# Patient Record
Sex: MALE | Race: WHITE | NOT HISPANIC OR LATINO | Employment: OTHER | URBAN - METROPOLITAN AREA
[De-identification: names, ages, dates, MRNs, and addresses within clinical notes are randomized per-mention and may not be internally consistent; named-entity substitution may affect disease eponyms.]

---

## 2019-02-27 ENCOUNTER — OFFICE VISIT CONVERTED (OUTPATIENT)
Dept: ORTHOPEDIC SURGERY | Facility: CLINIC | Age: 59
End: 2019-02-27
Attending: ORTHOPAEDIC SURGERY

## 2020-02-24 ENCOUNTER — OFFICE VISIT CONVERTED (OUTPATIENT)
Dept: ORTHOPEDIC SURGERY | Facility: CLINIC | Age: 60
End: 2020-02-24
Attending: ORTHOPAEDIC SURGERY

## 2021-05-15 VITALS — OXYGEN SATURATION: 98 % | BODY MASS INDEX: 35.17 KG/M2 | HEIGHT: 73 IN | WEIGHT: 265.37 LBS | HEART RATE: 72 BPM

## 2021-05-15 VITALS — HEART RATE: 78 BPM | OXYGEN SATURATION: 97 % | HEIGHT: 73 IN | BODY MASS INDEX: 34.59 KG/M2 | WEIGHT: 261 LBS

## 2021-07-14 ENCOUNTER — OFFICE VISIT (OUTPATIENT)
Dept: ORTHOPEDIC SURGERY | Facility: CLINIC | Age: 61
End: 2021-07-14

## 2021-07-14 VITALS — HEART RATE: 75 BPM | BODY MASS INDEX: 36.37 KG/M2 | WEIGHT: 274.4 LBS | HEIGHT: 73 IN | OXYGEN SATURATION: 97 %

## 2021-07-14 DIAGNOSIS — M19.011 PRIMARY OSTEOARTHRITIS OF SHOULDERS, BILATERAL: ICD-10-CM

## 2021-07-14 DIAGNOSIS — M25.511 RIGHT SHOULDER PAIN, UNSPECIFIED CHRONICITY: ICD-10-CM

## 2021-07-14 DIAGNOSIS — M25.512 LEFT SHOULDER PAIN, UNSPECIFIED CHRONICITY: Primary | ICD-10-CM

## 2021-07-14 DIAGNOSIS — M19.012 PRIMARY OSTEOARTHRITIS OF SHOULDERS, BILATERAL: ICD-10-CM

## 2021-07-14 PROCEDURE — 99213 OFFICE O/P EST LOW 20 MIN: CPT | Performed by: ORTHOPAEDIC SURGERY

## 2021-07-14 PROCEDURE — 20610 DRAIN/INJ JOINT/BURSA W/O US: CPT | Performed by: ORTHOPAEDIC SURGERY

## 2021-07-14 RX ORDER — MELOXICAM 15 MG/1
15 TABLET ORAL DAILY
COMMUNITY
End: 2021-11-24 | Stop reason: HOSPADM

## 2021-07-14 RX ORDER — LOSARTAN POTASSIUM 25 MG/1
25 TABLET ORAL DAILY
COMMUNITY

## 2021-07-14 RX ORDER — OMEPRAZOLE 20 MG/1
20 CAPSULE, DELAYED RELEASE ORAL DAILY
COMMUNITY

## 2021-07-14 RX ADMIN — LIDOCAINE HYDROCHLORIDE 9 ML: 10 INJECTION, SOLUTION INFILTRATION; PERINEURAL at 12:25

## 2021-07-14 RX ADMIN — METHYLPREDNISOLONE ACETATE 80 MG: 80 INJECTION, SUSPENSION INTRA-ARTICULAR; INTRALESIONAL; INTRAMUSCULAR; SOFT TISSUE at 12:25

## 2021-07-14 NOTE — PROGRESS NOTES
"Chief Complaint  Initial Evaluation of the Left Shoulder and Initial Evaluation of the Right Shoulder     Subjective      Dell Marie presents to [facility] for follow up evaluation of bilateral shoulder pain. The patient has been having shoulder pain for over a year. He reports he was working a moved wrong and felt a sharp pain in his left shoulder. He states his right is worse than the left. He has previously had injections with relief. He was previously diagnosed with osteoarthritis in his shoulder.     No Known Allergies     Social History     Socioeconomic History   • Marital status:      Spouse name: Not on file   • Number of children: Not on file   • Years of education: Not on file   • Highest education level: Not on file   Tobacco Use   • Smoking status: Former Smoker   • Smokeless tobacco: Never Used   Substance and Sexual Activity   • Alcohol use: Yes     Comment: CURRENT SOME DAY    • Drug use: Not Currently     Comment: FORMER         Review of Systems     Objective   Vital Signs:   Pulse 75   Ht 185.4 cm (73\")   Wt 124 kg (274 lb 6.4 oz)   SpO2 97%   BMI 36.20 kg/m²       Physical Exam  Constitutional:       Appearance: Normal appearance. He is well-developed and normal weight.   HENT:      Head: Normocephalic.      Right Ear: Hearing and external ear normal.      Left Ear: Hearing and external ear normal.      Nose: Nose normal.   Eyes:      Conjunctiva/sclera: Conjunctivae normal.   Cardiovascular:      Rate and Rhythm: Normal rate.   Pulmonary:      Effort: Pulmonary effort is normal.      Breath sounds: No wheezing or rales.   Abdominal:      Palpations: Abdomen is soft.      Tenderness: There is no abdominal tenderness.   Musculoskeletal:      Cervical back: Normal range of motion.   Skin:     Findings: No rash.   Neurological:      Mental Status: He is alert and oriented to person, place, and time.   Psychiatric:         Mood and Affect: Mood and " affect normal.         Judgment: Judgment normal.       Ortho Exam      Bilateral shoulder- rotator cuff weakness. Deformity of biceps distally of the left shoulder. Neurovascularly intact. Sensation to light touch median, radial, ulnar nerve. Positive AIN, PIN, ulnar nerve. Positive pulses. Decreased ROM.     Large Joint Arthrocentesis: R subacromial bursa  Date/Time: 7/14/2021 12:25 PM  Consent given by: patient  Site marked: site marked  Timeout: Immediately prior to procedure a time out was called to verify the correct patient, procedure, equipment, support staff and site/side marked as required   Supporting Documentation  Indications: pain   Procedure Details  Location: shoulder - R subacromial bursa  Preparation: Patient was prepped and draped in the usual sterile fashion  Needle size: 22 G  Medications administered: 9 mL lidocaine 1 %; 80 mg methylPREDNISolone acetate 80 MG/ML  Patient tolerance: patient tolerated the procedure well with no immediate complications    Large Joint Arthrocentesis: L subacromial bursa  Date/Time: 7/14/2021 12:25 PM  Consent given by: patient  Site marked: site marked  Timeout: Immediately prior to procedure a time out was called to verify the correct patient, procedure, equipment, support staff and site/side marked as required   Supporting Documentation  Indications: pain   Procedure Details  Location: shoulder - L subacromial bursa  Needle size: 22 G  Medications administered: 9 mL lidocaine 1 %; 80 mg methylPREDNISolone acetate 80 MG/ML  Patient tolerance: patient tolerated the procedure well with no immediate complications          X-Ray Report:  Bilateral shoulder(s) X-Ray  Indication: Evaluation of bilateral shoulder pain  AP and Lateral view(s)  Findings: moderate degenerative changes  Prior studies available for comparison: yes         Imaging Results (Most Recent)     Procedure Component Value Units Date/Time    XR Scapula Bilateral [[id number]] Resulted: 07/14/21 1207      Updated: 07/14/21 1207           Result Review :       No results found.          Assessment and Plan     DX: bilateral shoulder osteoarthritis     Discussed the risks and benefits of bilateral shoulder injections. The patient expressed understanding and wished to proceed. He tolerated the injections well.     Call or return if worsening symptoms.    Follow Up     Follow up PRN.       Patient was given instructions and counseling regarding his condition or for health maintenance advice. Please see specific information pulled into the AVS if appropriate.     Scribed for Tom Brennan MD by Vivian Chairez.  07/14/21   12:13 EDT    I have personally performed the services described in this document as scribed by the above individual and it is both accurate and complete.  Blaze Steinberg MD 07/14/21  12:13 EDT

## 2021-07-16 RX ORDER — LIDOCAINE HYDROCHLORIDE 10 MG/ML
9 INJECTION, SOLUTION INFILTRATION; PERINEURAL
Status: COMPLETED | OUTPATIENT
Start: 2021-07-14 | End: 2021-07-14

## 2021-07-16 RX ORDER — METHYLPREDNISOLONE ACETATE 80 MG/ML
80 INJECTION, SUSPENSION INTRA-ARTICULAR; INTRALESIONAL; INTRAMUSCULAR; SOFT TISSUE
Status: COMPLETED | OUTPATIENT
Start: 2021-07-14 | End: 2021-07-14

## 2021-07-28 NOTE — PROGRESS NOTES
"Chief Complaint  Initial Evaluation of the Left Shoulder and Initial Evaluation of the Right Shoulder     Subjective      Dell Marie presents to [facility] for follow up evaluation of bilateral shoulder pain. The patient has been having shoulder pain for over a year. He reports he was working a moved wrong and felt a sharp pain in his left shoulder. He states his right is worse than the left. He has previously had injections with relief. He was previously diagnosed with osteoarthritis in his shoulder.     No Known Allergies     Social History     Socioeconomic History   • Marital status:      Spouse name: Not on file   • Number of children: Not on file   • Years of education: Not on file   • Highest education level: Not on file   Tobacco Use   • Smoking status: Former Smoker   • Smokeless tobacco: Never Used   Substance and Sexual Activity   • Alcohol use: Yes     Comment: CURRENT SOME DAY    • Drug use: Not Currently     Comment: FORMER         Review of Systems     Objective   Vital Signs:   Pulse 75   Ht 185.4 cm (73\")   Wt 124 kg (274 lb 6.4 oz)   SpO2 97%   BMI 36.20 kg/m²       Physical Exam  Constitutional:       Appearance: Normal appearance. He is well-developed and normal weight.   HENT:      Head: Normocephalic.      Right Ear: Hearing and external ear normal.      Left Ear: Hearing and external ear normal.      Nose: Nose normal.   Eyes:      Conjunctiva/sclera: Conjunctivae normal.   Cardiovascular:      Rate and Rhythm: Normal rate.   Pulmonary:      Effort: Pulmonary effort is normal.      Breath sounds: No wheezing or rales.   Abdominal:      Palpations: Abdomen is soft.      Tenderness: There is no abdominal tenderness.   Musculoskeletal:      Cervical back: Normal range of motion.   Skin:     Findings: No rash.   Neurological:      Mental Status: He is alert and oriented to person, place, and time.   Psychiatric:         Mood and Affect: Mood and " affect normal.         Judgment: Judgment normal.       Ortho Exam      Bilateral shoulder- rotator cuff weakness. Deformity of biceps distally of the left shoulder. Neurovascularly intact. Sensation to light touch median, radial, ulnar nerve. Positive AIN, PIN, ulnar nerve. Positive pulses. Decreased ROM.     Procedures    X-Ray Report:  Bilateral shoulder(s) X-Ray  Indication: Evaluation of bilateral shoulder pain  AP and Lateral view(s)  Findings: moderate degenerative changes  Prior studies available for comparison: yes         Imaging Results (Most Recent)     Procedure Component Value Units Date/Time    XR Scapula Bilateral [[id number]] Resulted: 07/16/21 1353     Updated: 07/16/21 1354    Narrative:      X-Ray Report:  Bilateral shoulder(s) X-Ray  Indication: Evaluation of bilateral shoulder pain  AP and Lateral view(s)  Findings: moderate degenerative changes  Prior studies available for comparison: yes            Result Review :       XR Scapula Bilateral    Result Date: 7/16/2021  Narrative: X-Ray Report: Bilateral shoulder(s) X-Ray Indication: Evaluation of bilateral shoulder pain AP and Lateral view(s) Findings: moderate degenerative changes Prior studies available for comparison: yes             Assessment and Plan     DX: bilateral shoulder osteoarthritis     Discussed the risks and benefits of bilateral shoulder injections. The patient expressed understanding and wished to proceed. He tolerated the injections well.     Call or return if worsening symptoms.    Follow Up     Follow up PRN.       Patient was given instructions and counseling regarding his condition or for health maintenance advice. Please see specific information pulled into the AVS if appropriate.     Scribed for Tom Brennan MD by Vivian Chairez.  07/14/21   12:13 EDT    I have personally performed the services described in this document as scribed by the above individual and it is both accurate and complete. Tom Brennan,  MD 07/28/21

## 2021-08-11 ENCOUNTER — TELEPHONE (OUTPATIENT)
Dept: ORTHOPEDIC SURGERY | Facility: CLINIC | Age: 61
End: 2021-08-11

## 2021-08-11 DIAGNOSIS — M25.511 RIGHT SHOULDER PAIN, UNSPECIFIED CHRONICITY: ICD-10-CM

## 2021-08-11 DIAGNOSIS — M25.512 LEFT SHOULDER PAIN, UNSPECIFIED CHRONICITY: Primary | ICD-10-CM

## 2021-08-11 NOTE — TELEPHONE ENCOUNTER
Patient had appt on 7/14 and got lisa injection that day. He states Doc told him he can call to get an order for an abhi. He wants an order for ISABELLA shoulder MRI in [facility].

## 2021-08-30 ENCOUNTER — HOSPITAL ENCOUNTER (OUTPATIENT)
Dept: MRI IMAGING | Facility: HOSPITAL | Age: 61
Discharge: HOME | End: 2021-08-30

## 2021-08-30 DIAGNOSIS — M25.511 RIGHT SHOULDER PAIN, UNSPECIFIED CHRONICITY: ICD-10-CM

## 2021-08-30 DIAGNOSIS — M25.512 LEFT SHOULDER PAIN, UNSPECIFIED CHRONICITY: ICD-10-CM

## 2021-08-30 PROCEDURE — 73221 MRI JOINT UPR EXTREM W/O DYE: CPT

## 2021-09-03 ENCOUNTER — OFFICE VISIT (OUTPATIENT)
Dept: ORTHOPEDIC SURGERY | Facility: CLINIC | Age: 61
End: 2021-09-03

## 2021-09-03 ENCOUNTER — PREP FOR SURGERY (OUTPATIENT)
Dept: OTHER | Facility: HOSPITAL | Age: 61
End: 2021-09-03

## 2021-09-03 VITALS — HEART RATE: 78 BPM | OXYGEN SATURATION: 98 % | HEIGHT: 73 IN | WEIGHT: 278.6 LBS | BODY MASS INDEX: 36.92 KG/M2

## 2021-09-03 DIAGNOSIS — M12.812 ROTATOR CUFF ARTHROPATHY, LEFT: Primary | ICD-10-CM

## 2021-09-03 DIAGNOSIS — M75.122 COMPLETE TEAR OF LEFT ROTATOR CUFF, UNSPECIFIED WHETHER TRAUMATIC: Primary | ICD-10-CM

## 2021-09-03 PROCEDURE — 99213 OFFICE O/P EST LOW 20 MIN: CPT | Performed by: PHYSICIAN ASSISTANT

## 2021-09-03 RX ORDER — TRANEXAMIC ACID 10 MG/ML
1000 INJECTION, SOLUTION INTRAVENOUS ONCE
Status: CANCELLED | OUTPATIENT
Start: 2021-09-03 | End: 2021-09-03

## 2021-09-03 RX ORDER — CEFAZOLIN SODIUM IN 0.9 % NACL 3 G/100 ML
3 INTRAVENOUS SOLUTION, PIGGYBACK (ML) INTRAVENOUS ONCE
Status: CANCELLED | OUTPATIENT
Start: 2021-09-03 | End: 2021-09-03

## 2021-09-03 RX ORDER — CEFAZOLIN SODIUM 2 G/100ML
2 INJECTION, SOLUTION INTRAVENOUS ONCE
Status: CANCELLED | OUTPATIENT
Start: 2021-09-03 | End: 2021-09-03

## 2021-09-03 NOTE — PROGRESS NOTES
"Chief Complaint  Pain of the Right Shoulder and Pain of the Left Shoulder    Subjective          Dell Marie presents to [facility] for follow up of bilateral shoulder pain. Patient was having ongoing shoulder pain for several years that he states has worsened with time. He states he was given injections at his last visit on 07/14/21. He states injections helped with the right shoulder but not his left. He states he cannot even hold a pencil with his left hand because of pain and weakness of his shoulder. He states pain is constant like a toothache of his left shoulder. He states it has been this way since he can remember. Patient presents today with MRI results of bilateral shoulders.     Objective   Vital Signs:   Pulse 78   Ht 185.4 cm (73\")   Wt 126 kg (278 lb 9.6 oz)   SpO2 98%   BMI 36.76 kg/m²       Physical Exam  Constitutional:       Appearance: Normal appearance. Patient is well-developed and normal weight.   HENT:      Head: Normocephalic.      Right Ear: Hearing and external ear normal.      Left Ear: Hearing and external ear normal.      Nose: Nose normal.   Eyes:      Conjunctiva/sclera: Conjunctivae normal.   Cardiovascular:      Rate and Rhythm: Normal rate.   Pulmonary:      Effort: Pulmonary effort is normal.      Breath sounds: No wheezing or rales.   Abdominal:      Palpations: Abdomen is soft.      Tenderness: There is no abdominal tenderness.   Musculoskeletal:      Cervical back: Normal range of motion.   Skin:     Findings: No rash.   Neurological:      Mental Status: Patient is alert and oriented to person, place, and time.   Psychiatric:         Mood and Affect: Mood and affect normal.         Judgment: Judgment normal.     Ortho Exam  Left shoulder: Sensation grossly intact.  Neurovascular intact.  Radial and ulnar pulse are 2+.  Atrophy of the RC.  Tenderness palpation of biceps tendon insertion.  Tender on lateral shoulder.  Decreased strength of RC, " 3/5.  Decreased active range of motion in all planes.  Full AROM of the elbow and of the wrist.    Right shoulder: Sensation grossly intact.  Neurovascular intact.  Radial and ulnar pulse are 2+.  Rotator cuff atrophy.  Tenderness over scapula.  Decreased strength bilaterally.  AROM is decreased in all planes.  Patient able to flex and extend the elbow.  Able make a fist with good  strength.    Result Review :   The following data was reviewed by: MISTY Heredia on 09/03/2021:         Imaging Results (Most Recent)     None           MRI Shoulder Right Without Contrast    Result Date: 8/30/2021  Narrative: PROCEDURE: MRI SHOULDER RIGHT WO CONTRAST  COMPARISON: [facility] , CR, XR SCAPULA BILATERAL, 7/14/2021, 12:07.  INDICATIONS: Right Shoulder Pain CHRONIC PAIN. NO KNOWN INJURY.  TECHNIQUE: A variety of imaging planes and parameters were utilized for visualization of suspected pathology.  Images were performed without contrast.   FINDINGS:  ROTATOR CUFF:  Full-thickness, full width tear of the supraspinatus tendon from the footplate with approximately 4 cm medial retraction of the bulk of the tendon fibers.  Full-thickness, near full width articular sided tear of the subscapularis tendon from the footplate with up to 4 cm medial retraction of the articular sided tendon fibers and only a few of the far inferior bursal sided tendon fibers remaining intact.  Moderate infraspinatus tendinopathy with mild partial-thickness articular sided fraying at the footplate.  Teres minor is intact.  Mild (grade 1/4) fatty atrophy of the rotator cuff muscles.  No myositis.  LONG HEAD BICEPS TENDON:  Mild to moderate intra-articular long head biceps tendinopathy with the tendon dislocated medially from the bicipital groove along the anterior glenohumeral joint space, deep to the subscapularis tendon.  ACROMIOCLAVICULAR ARCH:  Moderate hypertrophic degenerative changes at the acromioclavicular joint with a small joint  effusion.  Type II acromion without lateral downsloping.  Abnormal fluid in the subacromial/subdeltoid bursa communicating through the full-thickness rotator cuff tear.  GLENOHUMERAL JOINT:  Superior subluxation of the humeral head in relation to the glenoid, likely related to the rotator cuff pathology.  Small joint effusion.  Mild diffuse chondromalacia/DJD.  No discrete intra-articular body is identified.  The joint capsule is within normal limits.  BONES:  Mild degenerative subcortical cystic changes in the distal clavicle and acromion.  Nonspecific subcortical cystic changes in the greater tuberosity and posterior humeral head.  No fracture.  No concerning bone marrow lesion or marrow replacing process.  LABRUM: No evidence of acute labral tear.  No paralabral cyst.  SOFT TISSUES: No soft tissue mass.  CONCLUSION:  1. Full-thickness, full width tear of the supraspinatus tendon from the footplate with medial retraction. 2. Full-thickness, near full width tear of the subscapularis tendon with medial retraction. 3. Moderate infraspinatus tendinopathy with mild partial-thickness articular sided fraying at the footplate. 4. Mild to moderate intra-articular long head biceps tendinopathy with the tendon dislocated medially from the bicipital groove along the anterior glenohumeral joint space. 5. Mild to moderate glenohumeral joint chondromalacia/DJD with a small joint effusion.  6. Moderate DJD at the AC joint with a small joint effusion.      OMAR BARRIENTOS MD       Electronically Signed and Approved By: OMAR BARRIENTOS MD on 8/30/2021 at 11:46             MRI Shoulder Left Without Contrast    Result Date: 8/30/2021  Narrative: PROCEDURE: MRI SHOULDER LEFT WO CONTRAST  COMPARISON: [facility] , CR, XR SCAPULA BILATERAL, 7/14/2021, 12:07.  INDICATIONS: Left Shoulder Pain ALL OVER CHRONIC PAIN. NO KNOWN INJURY.  TECHNIQUE: A variety of imaging planes and parameters were utilized for visualization of suspected  pathology.  Images were performed without contrast.   FINDINGS:  ROTATOR CUFF:  Full-thickness, near full width tear of the supraspinatus tendon from the footplate with up to approximately 3 cm medial retraction of the bulk of the articular sided tendon fibers and only a few of the far posterior bursal sided tendon fibers remaining intact on the greater tuberosity.  Moderate to high-grade partial thickness, near full width articular sided tear of the subscapularis tendon from the footplate, involving up to approximately 50 percent of the articular sided tendon fibers and with mild medial retraction of the articular sided tendon fibers.  Mild subscapularis tendinopathy.  Teres minor is intact.  Mild (grade 1/4) fatty atrophy of the rotator cuff muscles.  Mild likely posttraumatic edema in the supraspinatus and infraspinatus muscles.  No myositis.  LONG HEAD BICEPS TENDON:  The intra-articular long head biceps tendon is not identified, suggesting full-thickness tear and distal retraction.  ACROMIOCLAVICULAR ARCH:  Moderate hypertrophic degenerative changes at the acromioclavicular joint with a small joint effusion.  Type II acromion without lateral downsloping.  Abnormal fluid in the subacromial/subdeltoid bursa, communicating through the full-thickness rotator cuff tear.  GLENOHUMERAL JOINT:  Mild superior subluxation of the humeral head in relation to the glenoid, likely related to the rotator cuff pathology.  Small joint effusion.  Mild diffuse chondromalacia/DJD.  No discrete intra-articular body is identified.  The joint capsule is within normal limits.  BONES:  Nonspecific subcortical cystic changes in the greater tuberosity.  No fracture.  No concerning bone marrow lesion or marrow replacing process.  LABRUM:  Degenerative signal and morphology of the superior labrum.  No paralabral cyst.  SOFT TISSUES: No soft tissue mass.  CONCLUSION:  1. Full-thickness, near full width tear of the supraspinatus tendon from  the footplate with medial retraction and only a few of the far posterior bursal sided tendon fibers remaining intact on the greater tuberosity. 2. Moderate to high-grade partial thickness, near full width articular sided tear of the infraspinatus tendon at the footplate. 3. The intra-articular long head biceps tendon is not identified, suggesting full-thickness tear and distal retraction.  4. Mild subscapularis tendinopathy.  New mild glenohumeral joint chondromalacia/DJD with a small joint effusion. 5. Moderate DJD at the AC joint with a small joint effusion.      OMAR BARRIENTOS MD       Electronically Signed and Approved By: OMAR BARRIENTOS MD on 8/30/2021 at 11:41                  Assessment and Plan    Problem List Items Addressed This Visit        Musculoskeletal and Injuries    Complete tear of left rotator cuff - Primary          Follow Up   Return for post op.  Patient Instructions   Dr. Brennan discussed treatment options with patient. Risks and benefits of proceeding with total reverse shoulder arthroplasty, as well as post op care.   Patient wishes to proceed with reverse TSA and will schedule today.   Please call with any changes or concerns. Otherwise, follow up post-operatively.     Patient was given instructions and counseling regarding his condition or for health maintenance advice. Please see specific information pulled into the AVS if appropriate.

## 2021-09-03 NOTE — PATIENT INSTRUCTIONS
Been discussed treatment options with patient. Risks and benefits of proceeding with total reverse shoulder arthroplasty, as well as post op care.   Patient wishes to proceed with reverse TSA and will schedule today.   Please call with any changes or concerns. Otherwise, follow up post-operatively.

## 2021-09-28 ENCOUNTER — APPOINTMENT (OUTPATIENT)
Dept: PREADMISSION TESTING | Facility: HOSPITAL | Age: 61
End: 2021-09-28

## 2021-11-10 ENCOUNTER — PRE-ADMISSION TESTING (OUTPATIENT)
Dept: PREADMISSION TESTING | Facility: HOSPITAL | Age: 61
End: 2021-11-10

## 2021-11-10 VITALS
HEIGHT: 73 IN | BODY MASS INDEX: 35.85 KG/M2 | TEMPERATURE: 98.6 F | OXYGEN SATURATION: 98 % | HEART RATE: 62 BPM | WEIGHT: 270.5 LBS | SYSTOLIC BLOOD PRESSURE: 150 MMHG | DIASTOLIC BLOOD PRESSURE: 106 MMHG

## 2021-11-10 DIAGNOSIS — M12.812 ROTATOR CUFF ARTHROPATHY, LEFT: ICD-10-CM

## 2021-11-10 LAB
ALBUMIN SERPL-MCNC: 4 G/DL (ref 3.5–5.2)
ALBUMIN/GLOB SERPL: 1.3 G/DL
ALP SERPL-CCNC: 63 U/L (ref 39–117)
ALT SERPL W P-5'-P-CCNC: 27 U/L (ref 1–41)
ANION GAP SERPL CALCULATED.3IONS-SCNC: 7.6 MMOL/L (ref 5–15)
AST SERPL-CCNC: 24 U/L (ref 1–40)
BASOPHILS # BLD AUTO: 0.03 10*3/MM3 (ref 0–0.2)
BASOPHILS NFR BLD AUTO: 0.5 % (ref 0–1.5)
BILIRUB SERPL-MCNC: 0.3 MG/DL (ref 0–1.2)
BUN SERPL-MCNC: 17 MG/DL (ref 6–20)
BUN/CREAT SERPL: 16.8 (ref 7–25)
CALCIUM SPEC-SCNC: 8.9 MG/DL (ref 8.6–10.5)
CHLORIDE SERPL-SCNC: 102 MMOL/L (ref 98–107)
CO2 SERPL-SCNC: 26.4 MMOL/L (ref 22–29)
CREAT SERPL-MCNC: 1.01 MG/DL (ref 0.76–1.27)
DEPRECATED RDW RBC AUTO: 40.6 FL (ref 37–54)
EOSINOPHIL # BLD AUTO: 0.17 10*3/MM3 (ref 0–0.4)
EOSINOPHIL NFR BLD AUTO: 2.7 % (ref 0.3–6.2)
ERYTHROCYTE [DISTWIDTH] IN BLOOD BY AUTOMATED COUNT: 11.7 % (ref 12.3–15.4)
GFR SERPL CREATININE-BSD FRML MDRD: 76 ML/MIN/1.73
GLOBULIN UR ELPH-MCNC: 3 GM/DL
GLUCOSE SERPL-MCNC: 99 MG/DL (ref 65–99)
HBA1C MFR BLD: 6.06 % (ref 4.8–5.6)
HCT VFR BLD AUTO: 46.9 % (ref 37.5–51)
HGB BLD-MCNC: 16.1 G/DL (ref 13–17.7)
IMM GRANULOCYTES # BLD AUTO: 0.03 10*3/MM3 (ref 0–0.05)
IMM GRANULOCYTES NFR BLD AUTO: 0.5 % (ref 0–0.5)
INR PPP: 1.08 (ref 2–3)
LYMPHOCYTES # BLD AUTO: 2.45 10*3/MM3 (ref 0.7–3.1)
LYMPHOCYTES NFR BLD AUTO: 39.5 % (ref 19.6–45.3)
MCH RBC QN AUTO: 32.3 PG (ref 26.6–33)
MCHC RBC AUTO-ENTMCNC: 34.3 G/DL (ref 31.5–35.7)
MCV RBC AUTO: 94 FL (ref 79–97)
MONOCYTES # BLD AUTO: 0.71 10*3/MM3 (ref 0.1–0.9)
MONOCYTES NFR BLD AUTO: 11.5 % (ref 5–12)
NEUTROPHILS NFR BLD AUTO: 2.81 10*3/MM3 (ref 1.7–7)
NEUTROPHILS NFR BLD AUTO: 45.3 % (ref 42.7–76)
NRBC BLD AUTO-RTO: 0 /100 WBC (ref 0–0.2)
PLATELET # BLD AUTO: 199 10*3/MM3 (ref 140–450)
PMV BLD AUTO: 10.9 FL (ref 6–12)
POTASSIUM SERPL-SCNC: 4 MMOL/L (ref 3.5–5.2)
PROT SERPL-MCNC: 7 G/DL (ref 6–8.5)
PROTHROMBIN TIME: 11.2 SECONDS (ref 9.4–12)
QT INTERVAL: 388 MS
RBC # BLD AUTO: 4.99 10*6/MM3 (ref 4.14–5.8)
SODIUM SERPL-SCNC: 136 MMOL/L (ref 136–145)
WBC # BLD AUTO: 6.2 10*3/MM3 (ref 3.4–10.8)

## 2021-11-10 PROCEDURE — 93010 ELECTROCARDIOGRAM REPORT: CPT | Performed by: SPECIALIST

## 2021-11-10 PROCEDURE — 85610 PROTHROMBIN TIME: CPT

## 2021-11-10 PROCEDURE — 36415 COLL VENOUS BLD VENIPUNCTURE: CPT

## 2021-11-10 PROCEDURE — 80053 COMPREHEN METABOLIC PANEL: CPT

## 2021-11-10 PROCEDURE — 83036 HEMOGLOBIN GLYCOSYLATED A1C: CPT

## 2021-11-10 PROCEDURE — 93005 ELECTROCARDIOGRAM TRACING: CPT

## 2021-11-10 PROCEDURE — 85025 COMPLETE CBC W/AUTO DIFF WBC: CPT

## 2021-11-10 RX ORDER — SULFASALAZINE 500 MG/1
500 TABLET ORAL 2 TIMES DAILY
COMMUNITY

## 2021-11-10 RX ORDER — TADALAFIL 10 MG/1
10 TABLET ORAL DAILY PRN
Status: ON HOLD | COMMUNITY
End: 2021-11-23

## 2021-11-10 NOTE — DISCHARGE INSTRUCTIONS
IMPORTANT INSTRUCTIONS - PRE-ADMISSION TESTING  1. DO NOT EAT OR CHEW anything after midnight the night before your procedure.    2. You may have CLEAR liquids up to __3___ hours prior to ARRIVAL time.   3. Take the following medications the morning of your procedure with JUST A SIP OF WATER:  _none______    4. DO NOT BRING your medications to the hospital with you, UNLESS something has changed since your PRE-Admission Testing appointment.  5. Hold all vitamins, supplements, and NSAIDS (Non- steroidal anti-inflammatory meds) for one week prior to surgery (you MAY take Tylenol or Acetaminophen).  6. If you are diabetic, check your blood sugar the morning of your procedure. If it is less than 70 or if you are feeling symptomatic, call the following number for further instructions: [phone]  7. Use your inhalers/nebulizers as usual, the morning of your procedure. BRING YOUR INHALERS with you.   8. Bring your CPAP or BIPAP to hospital, ONLY IF YOU WILL BE SPENDING THE NIGHT.   9. Make sure you have a ride home and have someone who will stay with you the day of your procedure after you go home.  10. If you have any questions, please call your Pre-Admission Testing Nurse  at [phone]   11. Per anesthesia request, do not smoke for 24 hours before your procedure or as instructed by your surgeon.      ••••••Clear Liquid Diet        Find out when you need to start a clear liquid diet.   Think of “clear liquids” as anything you could read a newspaper through. This includes things like water, broth, sports drinks, or tea WITHOUT any kind of milk or cream.           Once you are told to start a clear liquid diet, only drink these things until 3 hours before arrival to the hospital or when the hospital says to stop. Total volume limitation: 8 oz.       Clear liquids you CAN drink:   ; Water   ; Clear broth: beef, chicken, vegetable, or bone broth with nothing in it   ; Gatorade   ; Lemonade or Bertin-aid   ; Soda    ; Tea, coffee (NO cream or honey)   ; Jell-O (without fruit)   ; Popsicles (without fruit or cream)   ; Italian ices   ; Juice without pulp: apple, white, grape   ; You may use salt, pepper, and sugar    Do NOT drink:   ; Milk or cream   ; Soy milk, almond milk, coconut milk, or other non-dairy drinks and   creamers   ; Milkshakes or smoothies   ; Tomato juice   ; Orange juice   ; Grapefruit juice   ; Cream soups or any other than broth         Clear Liquid Diet:  ? Do NOT eat any solid food.  ? Do NOT eat or suck on mints or candy.  ? Do NOT chew gum.  ? Do NOT drink thick liquids like milk or juice with pulp in it.  ? Do NOT add milk, cream, or anything like soy milk or almond milk to coffee or tea.     PREOPERATIVE (BEFORE SURGERY)              BATHING INSTRUCTIONS  Instructions:   • You will need to shower 3 separate times utilizing the soap provided; at the times indicated   below:     11/21/21- PM   11/22/21- AM  11/22/21- PM     • Wash your hair and face with normal shampoo and soap, rinse it well before using the surgical soap.     • In the shower, wet the skin completely with water from your neck to your feet. Apply the cleanser to your   body ONLY FROM THE NECK TO YOUR FEET.    • Do NOT USE THE CLEANSER ON YOUR FACE, HEAD, OR GENITAL (PRIVATE) AREAS.   Keep it out of your eyes, ears, and mouth because of the risk of injury to those areas.     • Scrub with a clean washcloth for each bath utilizing the soap provided from the top of your body to the   bottom starting at the neck area.     • Pay close attention to your armpits, groin area, and the site of surgery.     • Wash your body gently for 5 minutes. Stand outside the stream or turn off the water while scrubbing your   body. Do NOT wash with your regular soap after the surgical cleanser is used.     • RINSE THE CLEANSER OFF COMPLETELY with plenty of water. Rinse the area again thoroughly.     • Dry off with a clean towel. The surgical soap can cause  dryness; however do NOT APPLY LOTION,   CREAM, POWDER, and/or DEODORANT AFTER SHOWERING.    • Be sure to where clean clothes after showering.     • Ensure CLEAN BED LINENS AFTER FIRST wash with the surgical soap.     • NO PETS ALLOWED IN THE BED with you after utilizing the surgical soap.

## 2021-11-12 ENCOUNTER — ANESTHESIA EVENT (OUTPATIENT)
Dept: PERIOP | Facility: HOSPITAL | Age: 61
End: 2021-11-12

## 2021-11-23 ENCOUNTER — ANESTHESIA (OUTPATIENT)
Dept: PERIOP | Facility: HOSPITAL | Age: 61
End: 2021-11-23

## 2021-11-23 ENCOUNTER — HOSPITAL ENCOUNTER (OUTPATIENT)
Facility: HOSPITAL | Age: 61
Discharge: HOME | End: 2021-11-24
Attending: ORTHOPAEDIC SURGERY | Admitting: ORTHOPAEDIC SURGERY

## 2021-11-23 ENCOUNTER — APPOINTMENT (OUTPATIENT)
Dept: GENERAL RADIOLOGY | Facility: HOSPITAL | Age: 61
End: 2021-11-23

## 2021-11-23 DIAGNOSIS — R26.2 DIFFICULTY IN WALKING: ICD-10-CM

## 2021-11-23 DIAGNOSIS — M12.812 ROTATOR CUFF ARTHROPATHY OF LEFT SHOULDER: ICD-10-CM

## 2021-11-23 DIAGNOSIS — M12.812 ROTATOR CUFF ARTHROPATHY, LEFT: ICD-10-CM

## 2021-11-23 DIAGNOSIS — Z78.9 DECREASED ACTIVITIES OF DAILY LIVING (ADL): Primary | ICD-10-CM

## 2021-11-23 PROCEDURE — 25010000002 PROPOFOL 10 MG/ML EMULSION: Performed by: NURSE ANESTHETIST, CERTIFIED REGISTERED

## 2021-11-23 PROCEDURE — 73020 X-RAY EXAM OF SHOULDER: CPT

## 2021-11-23 PROCEDURE — 94799 UNLISTED PULMONARY SVC/PX: CPT

## 2021-11-23 PROCEDURE — 0 CEFAZOLIN IN DEXTROSE 2-4 GM/100ML-% SOLUTION: Performed by: ORTHOPAEDIC SURGERY

## 2021-11-23 PROCEDURE — 25010000002 FENTANYL CITRATE (PF) 50 MCG/ML SOLUTION: Performed by: NURSE ANESTHETIST, CERTIFIED REGISTERED

## 2021-11-23 PROCEDURE — 23472 RECONSTRUCT SHOULDER JOINT: CPT | Performed by: ORTHOPAEDIC SURGERY

## 2021-11-23 PROCEDURE — 25010000002 MIDAZOLAM PER 1MG: Performed by: ANESTHESIOLOGY

## 2021-11-23 PROCEDURE — 25010000002 DEXAMETHASONE PER 1 MG: Performed by: NURSE ANESTHETIST, CERTIFIED REGISTERED

## 2021-11-23 PROCEDURE — 99204 OFFICE O/P NEW MOD 45 MIN: CPT | Performed by: INTERNAL MEDICINE

## 2021-11-23 PROCEDURE — 25010000002 CEFAZOLIN PER 500 MG: Performed by: ORTHOPAEDIC SURGERY

## 2021-11-23 PROCEDURE — C1776 JOINT DEVICE (IMPLANTABLE): HCPCS | Performed by: ORTHOPAEDIC SURGERY

## 2021-11-23 PROCEDURE — 25010000002 ONDANSETRON PER 1 MG: Performed by: NURSE ANESTHETIST, CERTIFIED REGISTERED

## 2021-11-23 DEVICE — BASEPLT GLEN COMPR MINI W TPR ADAPTR 25: Type: IMPLANTABLE DEVICE | Site: SHOULDER | Status: FUNCTIONAL

## 2021-11-23 DEVICE — SCRW COMPRNSV CNTRL 6.5X35MM REUS: Type: IMPLANTABLE DEVICE | Site: SHOULDER | Status: FUNCTIONAL

## 2021-11-23 DEVICE — TOTL SHLDER REV: Type: IMPLANTABLE DEVICE | Site: SHOULDER | Status: FUNCTIONAL

## 2021-11-23 DEVICE — BEAR HUM PROLNG STD 36MM: Type: IMPLANTABLE DEVICE | Site: SHOULDER | Status: FUNCTIONAL

## 2021-11-23 DEVICE — SCRW FIX LK HEX 4.75X30MM: Type: IMPLANTABLE DEVICE | Site: SHOULDER | Status: FUNCTIONAL

## 2021-11-23 DEVICE — TRY HUM/SHLDR COMPREHENSIVE/REVERSE MINI COCR STD 40MM: Type: IMPLANTABLE DEVICE | Site: SHOULDER | Status: FUNCTIONAL

## 2021-11-23 DEVICE — SCRW FIX LK HEX 4.75X15MM: Type: IMPLANTABLE DEVICE | Site: SHOULDER | Status: FUNCTIONAL

## 2021-11-23 DEVICE — GLENOSPHERE VERSA DIAL FIX STD 36MM: Type: IMPLANTABLE DEVICE | Site: SHOULDER | Status: FUNCTIONAL

## 2021-11-23 DEVICE — STEM HUM/SHLDR COMPREHENSIVE MINI 11X83MM: Type: IMPLANTABLE DEVICE | Site: SHOULDER | Status: FUNCTIONAL

## 2021-11-23 RX ORDER — SULFASALAZINE 500 MG/1
500 TABLET ORAL 2 TIMES DAILY
Status: DISCONTINUED | OUTPATIENT
Start: 2021-11-23 | End: 2021-11-24 | Stop reason: HOSPADM

## 2021-11-23 RX ORDER — CEFAZOLIN SODIUM 2 G/100ML
2 INJECTION, SOLUTION INTRAVENOUS EVERY 8 HOURS
Status: COMPLETED | OUTPATIENT
Start: 2021-11-23 | End: 2021-11-24

## 2021-11-23 RX ORDER — CEFAZOLIN SODIUM 2 G/100ML
2 INJECTION, SOLUTION INTRAVENOUS ONCE
Status: DISCONTINUED | OUTPATIENT
Start: 2021-11-23 | End: 2021-11-23 | Stop reason: DRUGHIGH

## 2021-11-23 RX ORDER — AMOXICILLIN 250 MG
2 CAPSULE ORAL 2 TIMES DAILY PRN
Status: DISCONTINUED | OUTPATIENT
Start: 2021-11-23 | End: 2021-11-24 | Stop reason: HOSPADM

## 2021-11-23 RX ORDER — PHENYLEPHRINE HCL IN 0.9% NACL 1 MG/10 ML
SYRINGE (ML) INTRAVENOUS AS NEEDED
Status: DISCONTINUED | OUTPATIENT
Start: 2021-11-23 | End: 2021-11-23 | Stop reason: SURG

## 2021-11-23 RX ORDER — PANTOPRAZOLE SODIUM 40 MG/1
40 TABLET, DELAYED RELEASE ORAL EVERY MORNING
Status: DISCONTINUED | OUTPATIENT
Start: 2021-11-24 | End: 2021-11-23

## 2021-11-23 RX ORDER — GLYCOPYRROLATE 0.2 MG/ML
0.2 INJECTION INTRAMUSCULAR; INTRAVENOUS
Status: COMPLETED | OUTPATIENT
Start: 2021-11-23 | End: 2021-11-23

## 2021-11-23 RX ORDER — SODIUM CHLORIDE 0.9 % (FLUSH) 0.9 %
10 SYRINGE (ML) INJECTION AS NEEDED
Status: DISCONTINUED | OUTPATIENT
Start: 2021-11-23 | End: 2021-11-24 | Stop reason: HOSPADM

## 2021-11-23 RX ORDER — ONDANSETRON 2 MG/ML
INJECTION INTRAMUSCULAR; INTRAVENOUS AS NEEDED
Status: DISCONTINUED | OUTPATIENT
Start: 2021-11-23 | End: 2021-11-23 | Stop reason: SURG

## 2021-11-23 RX ORDER — LIDOCAINE HYDROCHLORIDE 20 MG/ML
INJECTION, SOLUTION INFILTRATION; PERINEURAL AS NEEDED
Status: DISCONTINUED | OUTPATIENT
Start: 2021-11-23 | End: 2021-11-23 | Stop reason: SURG

## 2021-11-23 RX ORDER — PROPOFOL 10 MG/ML
VIAL (ML) INTRAVENOUS AS NEEDED
Status: DISCONTINUED | OUTPATIENT
Start: 2021-11-23 | End: 2021-11-23 | Stop reason: SURG

## 2021-11-23 RX ORDER — FAMOTIDINE 20 MG/1
40 TABLET, FILM COATED ORAL DAILY
Status: DISCONTINUED | OUTPATIENT
Start: 2021-11-23 | End: 2021-11-24 | Stop reason: HOSPADM

## 2021-11-23 RX ORDER — PROMETHAZINE HYDROCHLORIDE 25 MG/1
25 SUPPOSITORY RECTAL ONCE AS NEEDED
Status: DISCONTINUED | OUTPATIENT
Start: 2021-11-23 | End: 2021-11-23 | Stop reason: HOSPADM

## 2021-11-23 RX ORDER — SODIUM CHLORIDE, SODIUM LACTATE, POTASSIUM CHLORIDE, CALCIUM CHLORIDE 600; 310; 30; 20 MG/100ML; MG/100ML; MG/100ML; MG/100ML
100 INJECTION, SOLUTION INTRAVENOUS CONTINUOUS
Status: DISCONTINUED | OUTPATIENT
Start: 2021-11-23 | End: 2021-11-24 | Stop reason: HOSPADM

## 2021-11-23 RX ORDER — MIDAZOLAM HYDROCHLORIDE 2 MG/2ML
4 INJECTION, SOLUTION INTRAMUSCULAR; INTRAVENOUS ONCE
Status: COMPLETED | OUTPATIENT
Start: 2021-11-23 | End: 2021-11-23

## 2021-11-23 RX ORDER — OXYCODONE HYDROCHLORIDE 5 MG/1
5 TABLET ORAL
Status: DISCONTINUED | OUTPATIENT
Start: 2021-11-23 | End: 2021-11-23 | Stop reason: HOSPADM

## 2021-11-23 RX ORDER — OXYCODONE HYDROCHLORIDE AND ACETAMINOPHEN 5; 325 MG/1; MG/1
1 TABLET ORAL EVERY 4 HOURS PRN
Status: DISCONTINUED | OUTPATIENT
Start: 2021-11-23 | End: 2021-11-24 | Stop reason: HOSPADM

## 2021-11-23 RX ORDER — ACETAMINOPHEN 500 MG
1000 TABLET ORAL ONCE
Status: COMPLETED | OUTPATIENT
Start: 2021-11-23 | End: 2021-11-23

## 2021-11-23 RX ORDER — GABAPENTIN 300 MG/1
600 CAPSULE ORAL ONCE
Status: COMPLETED | OUTPATIENT
Start: 2021-11-23 | End: 2021-11-23

## 2021-11-23 RX ORDER — ASPIRIN 325 MG
325 TABLET, DELAYED RELEASE (ENTERIC COATED) ORAL DAILY
Status: DISCONTINUED | OUTPATIENT
Start: 2021-11-24 | End: 2021-11-24 | Stop reason: HOSPADM

## 2021-11-23 RX ORDER — EPHEDRINE SULFATE 50 MG/ML
INJECTION, SOLUTION INTRAVENOUS AS NEEDED
Status: DISCONTINUED | OUTPATIENT
Start: 2021-11-23 | End: 2021-11-23 | Stop reason: SURG

## 2021-11-23 RX ORDER — CEFAZOLIN SODIUM IN 0.9 % NACL 3 G/100 ML
3 INTRAVENOUS SOLUTION, PIGGYBACK (ML) INTRAVENOUS ONCE
Status: COMPLETED | OUTPATIENT
Start: 2021-11-23 | End: 2021-11-23

## 2021-11-23 RX ORDER — FENTANYL CITRATE 50 UG/ML
INJECTION, SOLUTION INTRAMUSCULAR; INTRAVENOUS AS NEEDED
Status: DISCONTINUED | OUTPATIENT
Start: 2021-11-23 | End: 2021-11-23 | Stop reason: SURG

## 2021-11-23 RX ORDER — PROMETHAZINE HYDROCHLORIDE 12.5 MG/1
25 TABLET ORAL ONCE AS NEEDED
Status: DISCONTINUED | OUTPATIENT
Start: 2021-11-23 | End: 2021-11-23 | Stop reason: HOSPADM

## 2021-11-23 RX ORDER — DEXAMETHASONE SODIUM PHOSPHATE 4 MG/ML
INJECTION, SOLUTION INTRA-ARTICULAR; INTRALESIONAL; INTRAMUSCULAR; INTRAVENOUS; SOFT TISSUE AS NEEDED
Status: DISCONTINUED | OUTPATIENT
Start: 2021-11-23 | End: 2021-11-23 | Stop reason: SURG

## 2021-11-23 RX ORDER — DEXMEDETOMIDINE HYDROCHLORIDE 100 UG/ML
INJECTION, SOLUTION INTRAVENOUS AS NEEDED
Status: DISCONTINUED | OUTPATIENT
Start: 2021-11-23 | End: 2021-11-23 | Stop reason: SURG

## 2021-11-23 RX ORDER — SODIUM CHLORIDE 0.9 % (FLUSH) 0.9 %
3 SYRINGE (ML) INJECTION EVERY 12 HOURS SCHEDULED
Status: DISCONTINUED | OUTPATIENT
Start: 2021-11-23 | End: 2021-11-24 | Stop reason: HOSPADM

## 2021-11-23 RX ORDER — OXYCODONE HYDROCHLORIDE AND ACETAMINOPHEN 5; 325 MG/1; MG/1
2 TABLET ORAL EVERY 4 HOURS PRN
Status: DISCONTINUED | OUTPATIENT
Start: 2021-11-23 | End: 2021-11-24 | Stop reason: HOSPADM

## 2021-11-23 RX ORDER — ACETAMINOPHEN 325 MG/1
650 TABLET ORAL EVERY 4 HOURS PRN
Status: DISCONTINUED | OUTPATIENT
Start: 2021-11-23 | End: 2021-11-24 | Stop reason: HOSPADM

## 2021-11-23 RX ORDER — ONDANSETRON 2 MG/ML
4 INJECTION INTRAMUSCULAR; INTRAVENOUS ONCE AS NEEDED
Status: DISCONTINUED | OUTPATIENT
Start: 2021-11-23 | End: 2021-11-23 | Stop reason: HOSPADM

## 2021-11-23 RX ORDER — CELECOXIB 100 MG/1
200 CAPSULE ORAL ONCE
Status: COMPLETED | OUTPATIENT
Start: 2021-11-23 | End: 2021-11-23

## 2021-11-23 RX ORDER — PROMETHAZINE HYDROCHLORIDE 12.5 MG/1
12.5 TABLET ORAL EVERY 6 HOURS PRN
Status: DISCONTINUED | OUTPATIENT
Start: 2021-11-23 | End: 2021-11-24 | Stop reason: HOSPADM

## 2021-11-23 RX ORDER — ACETAMINOPHEN 650 MG/1
650 SUPPOSITORY RECTAL EVERY 4 HOURS PRN
Status: DISCONTINUED | OUTPATIENT
Start: 2021-11-23 | End: 2021-11-24 | Stop reason: HOSPADM

## 2021-11-23 RX ORDER — SODIUM CHLORIDE 0.9 % (FLUSH) 0.9 %
10 SYRINGE (ML) INJECTION AS NEEDED
Status: DISCONTINUED | OUTPATIENT
Start: 2021-11-23 | End: 2021-11-23 | Stop reason: HOSPADM

## 2021-11-23 RX ORDER — CEFAZOLIN SODIUM 2 G/100ML
2 INJECTION, SOLUTION INTRAVENOUS EVERY 8 HOURS
Status: DISCONTINUED | OUTPATIENT
Start: 2021-11-23 | End: 2021-11-23

## 2021-11-23 RX ORDER — NALOXONE HCL 0.4 MG/ML
0.4 VIAL (ML) INJECTION
Status: DISCONTINUED | OUTPATIENT
Start: 2021-11-23 | End: 2021-11-24 | Stop reason: HOSPADM

## 2021-11-23 RX ORDER — ROCURONIUM BROMIDE 10 MG/ML
INJECTION, SOLUTION INTRAVENOUS AS NEEDED
Status: DISCONTINUED | OUTPATIENT
Start: 2021-11-23 | End: 2021-11-23 | Stop reason: SURG

## 2021-11-23 RX ORDER — SUCCINYLCHOLINE/SOD CL,ISO/PF 100 MG/5ML
SYRINGE (ML) INTRAVENOUS AS NEEDED
Status: DISCONTINUED | OUTPATIENT
Start: 2021-11-23 | End: 2021-11-23 | Stop reason: SURG

## 2021-11-23 RX ORDER — PROMETHAZINE HYDROCHLORIDE 12.5 MG/1
12.5 SUPPOSITORY RECTAL EVERY 6 HOURS PRN
Status: DISCONTINUED | OUTPATIENT
Start: 2021-11-23 | End: 2021-11-24 | Stop reason: HOSPADM

## 2021-11-23 RX ORDER — MAGNESIUM HYDROXIDE 1200 MG/15ML
LIQUID ORAL AS NEEDED
Status: DISCONTINUED | OUTPATIENT
Start: 2021-11-23 | End: 2021-11-23 | Stop reason: HOSPADM

## 2021-11-23 RX ORDER — MEPERIDINE HYDROCHLORIDE 25 MG/ML
12.5 INJECTION INTRAMUSCULAR; INTRAVENOUS; SUBCUTANEOUS
Status: DISCONTINUED | OUTPATIENT
Start: 2021-11-23 | End: 2021-11-23 | Stop reason: HOSPADM

## 2021-11-23 RX ORDER — SODIUM CHLORIDE, SODIUM LACTATE, POTASSIUM CHLORIDE, CALCIUM CHLORIDE 600; 310; 30; 20 MG/100ML; MG/100ML; MG/100ML; MG/100ML
9 INJECTION, SOLUTION INTRAVENOUS CONTINUOUS PRN
Status: DISCONTINUED | OUTPATIENT
Start: 2021-11-23 | End: 2021-11-23 | Stop reason: HOSPADM

## 2021-11-23 RX ORDER — ACETAMINOPHEN 325 MG/1
325 TABLET ORAL EVERY 4 HOURS PRN
Status: DISCONTINUED | OUTPATIENT
Start: 2021-11-23 | End: 2021-11-24 | Stop reason: HOSPADM

## 2021-11-23 RX ORDER — MORPHINE SULFATE 2 MG/ML
4 INJECTION, SOLUTION INTRAMUSCULAR; INTRAVENOUS
Status: DISCONTINUED | OUTPATIENT
Start: 2021-11-23 | End: 2021-11-24 | Stop reason: HOSPADM

## 2021-11-23 RX ADMIN — CEFAZOLIN SODIUM 2 G: 2 INJECTION, SOLUTION INTRAVENOUS at 21:26

## 2021-11-23 RX ADMIN — EPHEDRINE SULFATE 10 MG: 50 INJECTION INTRAVENOUS at 14:44

## 2021-11-23 RX ADMIN — SULFASALAZINE 500 MG: 500 TABLET ORAL at 21:26

## 2021-11-23 RX ADMIN — DEXAMETHASONE SODIUM PHOSPHATE 4 MG: 4 INJECTION INTRA-ARTICULAR; INTRALESIONAL; INTRAMUSCULAR; INTRAVENOUS; SOFT TISSUE at 14:59

## 2021-11-23 RX ADMIN — GLYCOPYRROLATE 0.2 MG: 0.2 INJECTION INTRAMUSCULAR; INTRAVENOUS at 13:39

## 2021-11-23 RX ADMIN — GABAPENTIN 600 MG: 300 CAPSULE ORAL at 13:06

## 2021-11-23 RX ADMIN — EPHEDRINE SULFATE 10 MG: 50 INJECTION INTRAVENOUS at 14:57

## 2021-11-23 RX ADMIN — Medication 2 G: at 14:14

## 2021-11-23 RX ADMIN — ACETAMINOPHEN 1000 MG: 500 TABLET ORAL at 13:06

## 2021-11-23 RX ADMIN — Medication 100 MG: at 14:15

## 2021-11-23 RX ADMIN — Medication 50 MCG: at 14:56

## 2021-11-23 RX ADMIN — ONDANSETRON 4 MG: 2 INJECTION INTRAMUSCULAR; INTRAVENOUS at 14:59

## 2021-11-23 RX ADMIN — TRANEXAMIC ACID 1000 MG: 100 INJECTION, SOLUTION INTRAVENOUS at 13:41

## 2021-11-23 RX ADMIN — MIDAZOLAM HYDROCHLORIDE 4 MG: 1 INJECTION, SOLUTION INTRAMUSCULAR; INTRAVENOUS at 13:38

## 2021-11-23 RX ADMIN — PROPOFOL 200 MG: 10 INJECTION, EMULSION INTRAVENOUS at 14:15

## 2021-11-23 RX ADMIN — SODIUM CHLORIDE, POTASSIUM CHLORIDE, SODIUM LACTATE AND CALCIUM CHLORIDE 100 ML/HR: 600; 310; 30; 20 INJECTION, SOLUTION INTRAVENOUS at 18:00

## 2021-11-23 RX ADMIN — ROCURONIUM BROMIDE 10 MG: 10 INJECTION INTRAVENOUS at 14:15

## 2021-11-23 RX ADMIN — FAMOTIDINE 40 MG: 20 TABLET, FILM COATED ORAL at 17:59

## 2021-11-23 RX ADMIN — SUGAMMADEX 200 MG: 100 INJECTION, SOLUTION INTRAVENOUS at 15:31

## 2021-11-23 RX ADMIN — CELECOXIB 200 MG: 100 CAPSULE ORAL at 13:06

## 2021-11-23 RX ADMIN — FENTANYL CITRATE 100 MCG: 50 INJECTION, SOLUTION INTRAMUSCULAR; INTRAVENOUS at 14:15

## 2021-11-23 RX ADMIN — LIDOCAINE HYDROCHLORIDE 50 MG: 20 INJECTION, SOLUTION INFILTRATION; PERINEURAL at 14:15

## 2021-11-23 RX ADMIN — DEXMEDETOMIDINE HYDROCHLORIDE 15 MCG: 100 INJECTION, SOLUTION, CONCENTRATE INTRAVENOUS at 14:15

## 2021-11-23 RX ADMIN — ROCURONIUM BROMIDE 40 MG: 10 INJECTION INTRAVENOUS at 14:27

## 2021-11-23 RX ADMIN — SODIUM CHLORIDE, POTASSIUM CHLORIDE, SODIUM LACTATE AND CALCIUM CHLORIDE 9 ML/HR: 600; 310; 30; 20 INJECTION, SOLUTION INTRAVENOUS at 13:06

## 2021-11-23 NOTE — ANESTHESIA POSTPROCEDURE EVALUATION
Patient: Dell Marie    Procedure Summary     Date: 11/23/21 Room / Location: [facility] OR 03 / [facility] MAIN OR    Anesthesia Start: 1407 Anesthesia Stop: 1548    Procedure: LEFT TOTAL SHOULDER REVERSE ARTHROPLASTY (Left Shoulder) Diagnosis:       Rotator cuff arthropathy, left      (Rotator cuff arthropathy, left [M12.812])    Surgeons: Tom Brennan MD Provider: Dell Robin MD    Anesthesia Type: general with block ASA Status: 3          Anesthesia Type: general with block    Vitals  Vitals Value Taken Time   BP 85/50 11/23/21 1610   Temp 36.2 °C (97.1 °F) 11/23/21 1545   Pulse 64 11/23/21 1614   Resp 15 11/23/21 1610   SpO2 94 % 11/23/21 1614   Vitals shown include unvalidated device data.        Post Anesthesia Care and Evaluation    Patient location during evaluation: bedside  Patient participation: complete - patient participated  Level of consciousness: awake and awake and alert  Pain management: adequate  Airway patency: patent  Anesthetic complications: No anesthetic complications  PONV Status: none  Cardiovascular status: acceptable  Respiratory status: acceptable  Hydration status: acceptable

## 2021-11-23 NOTE — H&P
[facility]  Date: 2021   Patient Name: Dell Marie  : 1960  MRN: [id number]  Primary Care Physician:  Rohit Coffey DO  Date of admission: 2021    Subjective   Subjective     Chief Complaint: Shoulder pain    HPI:    Dell Marie is a 61 y.o. male with PMH osteoarthritis, questionable rheumatoid arthritis, GERD, hypertension who presents for scheduled left shoulder replacement.  Patient states that prior to the procedure the pain had gradually been worsening since the  after he tore his rotator cuff. The patient reports pain and functional impairment including activities of daily living, they have moderate to severe resting pain, chronic inflammation, and swelling. The patient states that this pain is no longer relieved with conservative therapies including NSAIDs, injections, and physical therapy. The pain is dull and achy in nature, nonradiating, worse with activity. Because of the above the patient is admitted for a left shoulder replacement.  Postoperatively, he did have hypotension and nausea, these are currently resolved.  Pain is currently well controlled.      Personal History     Past Medical History:  Osteoarthritis  Questionable rheumatoid arthritis  GERD  Hypertension    Past Surgical History:  Left total knee replacement, colonoscopy, multiple arthroscopic left knee surgeries prior to replacement    Family History:   Father has DM, OA, HTN    Social History:   Previous smoker but quit in .  Social alcohol use.  No illicit drug use.    Home Medications:  losartan, meloxicam, omeprazole, and sulfaSALAzine    Allergies:  Allergies   Allergen Reactions   • Lisinopril Cough       Review of Systems   A 14 point review of systems was obtained and otherwise negative unless stated in the HPI    Objective   Objective     Vitals:   Temp:  [96.2 °F (35.7 °C)-97.9 °F (36.6 °C)] 96.2 °F (35.7 °C)  Heart Rate:  [57-82] 57  Resp:  [13-20]  16  BP: ()/(41-87) 84/41  Flow (L/min):  [1-3] 2    Physical Exam    Constitutional: Awake, alert, no acute distress   Eyes: Pupils equal, sclerae anicteric, no conjunctival injection   HENT: NCAT, mucous membranes moist   Neck: Supple, no thyromegaly, no lymphadenopathy, trachea midline   Respiratory: Clear to auscultation bilaterally, nonlabored respirations    Cardiovascular: RRR, no murmurs, rubs, or gallops, palpable pedal pulses bilaterally   Gastrointestinal: Positive bowel sounds, soft, nontender, nondistended   Musculoskeletal: Left shoulder in sling, expected postop ROM, no bilateral ankle edema, no clubbing or cyanosis to extremities   Psychiatric: Appropriate affect, cooperative   Neurologic: Oriented x 3, strength symmetric in all extremities, Cranial Nerves grossly intact to confrontation, speech clear   Skin: No rashes     Result Review    Result Review:  I have personally reviewed the results from the time of this admission to 11/23/2021 17:59 EST and agree with these findings:  [x]  Laboratory previous labs reviewed with A1c 6.06, hemoglobin 16, creatinine 1.01  [x]  Microbiology  [x]  Radiology shoulder x-ray personally reviewed   [x]  EKG/Telemetry   []  Cardiology/Vascular   []  Pathology  []  Old records  []  Other:     Assessment/Plan   Assessment / Plan     Assessment/Plan:   Left shoulder osteoarthritis status post total shoulder arthroscopy  Postoperative hypotension  Postoperative nausea/vomiting  Questionable rheumatoid arthritis  Hypertension  GERD    Observe overnight in the hospital for management of the above  Postoperative pain control with IV morphine and oral Norco   Patient will be started on anticoagulation per orthopedic surgery recommendations  Zofran if needed for nausea   Hold antihypertensives for now.  Will restart appropriate home medications including PPI and sulfasalazine  PT/OT/social work.    Trend renal function and electrolytes with a.m. BMP  Trend Hgb and WBC  with nancy REYNAGA    Discussed case with: Orthopedic surgery, bedside RN    DVT prophylaxis:  Mechanical DVT prophylaxis orders are present.    CODE STATUS:     Full code      Electronically signed by Curtis Martel MD, 11/23/21, 5:59 PM EST.

## 2021-11-23 NOTE — H&P
"History and physical    Chief Complaint  Pain of the Right Shoulder and Pain of the Left Shoulder        Subjective              Dell Marie presents to [facility] for follow up of bilateral shoulder pain. Patient was having ongoing shoulder pain for several years that he states has worsened with time. He states he was given injections at his last visit on 07/14/21. He states injections helped with the right shoulder but not his left. He states he cannot even hold a pencil with his left hand because of pain and weakness of his shoulder. He states pain is constant like a toothache of his left shoulder. He states it has been this way since he can remember. Patient presents today with MRI results of bilateral shoulders.            Objective      Vital Signs:   Pulse 78   Ht 185.4 cm (73\")   Wt 126 kg (278 lb 9.6 oz)   SpO2 98%   BMI 36.76 kg/m²        Physical Exam  Constitutional:       Appearance: Normal appearance. Patient is well-developed and normal weight.   HENT:      Head: Normocephalic.      Right Ear: Hearing and external ear normal.      Left Ear: Hearing and external ear normal.      Nose: Nose normal.   Eyes:      Conjunctiva/sclera: Conjunctivae normal.   Cardiovascular:      Rate and Rhythm: Normal rate.   Pulmonary:      Effort: Pulmonary effort is normal.      Breath sounds: No wheezing or rales.   Abdominal:      Palpations: Abdomen is soft.      Tenderness: There is no abdominal tenderness.   Musculoskeletal:      Cervical back: Normal range of motion.   Skin:     Findings: No rash.   Neurological:      Mental Status: Patient is alert and oriented to person, place, and time.   Psychiatric:         Mood and Affect: Mood and affect normal.         Judgment: Judgment normal.      Ortho Exam  Left shoulder: Sensation grossly intact.  Neurovascular intact.  Radial and ulnar pulse are 2+.  Atrophy of the RC.  Tenderness palpation of biceps tendon insertion.  Tender on " lateral shoulder.  Decreased strength of RC, 3/5.  Decreased active range of motion in all planes.  Full AROM of the elbow and of the wrist.     Right shoulder: Sensation grossly intact.  Neurovascular intact.  Radial and ulnar pulse are 2+.  Rotator cuff atrophy.  Tenderness over scapula.  Decreased strength bilaterally.  AROM is decreased in all planes.  Patient able to flex and extend the elbow.  Able make a fist with good  strength.           Result Review    :   The following data was reviewed by: MISTY Heredia on 09/03/2021:               Imaging Results (Most Recent)      None            MRI Shoulder Right Without Contrast     Result Date: 8/30/2021  Narrative: PROCEDURE:       MRI SHOULDER RIGHT WO CONTRAST  COMPARISON:         [facility] , CR, XR SCAPULA BILATERAL, 7/14/2021, 12:07.  INDICATIONS:          Right Shoulder Pain CHRONIC PAIN. NO KNOWN INJURY.  TECHNIQUE:        A variety of imaging planes and parameters were utilized for visualization of suspected pathology.  Images were performed without contrast.   FINDINGS:             ROTATOR CUFF:  Full-thickness, full width tear of the supraspinatus tendon from the footplate with approximately 4 cm medial retraction of the bulk of the tendon fibers.  Full-thickness, near full width articular sided tear of the subscapularis tendon from the footplate with up to 4 cm medial retraction of the articular sided tendon fibers and only a few of the far inferior bursal sided tendon fibers remaining intact.  Moderate infraspinatus tendinopathy with mild partial-thickness articular sided fraying at the footplate.  Teres minor is intact.  Mild (grade 1/4) fatty atrophy of the rotator cuff muscles.  No myositis.  LONG HEAD BICEPS TENDON:  Mild to moderate intra-articular long head biceps tendinopathy with the tendon dislocated medially from the bicipital groove along the anterior glenohumeral joint space, deep to the subscapularis tendon.   ACROMIOCLAVICULAR ARCH:  Moderate hypertrophic degenerative changes at the acromioclavicular joint with a small joint effusion.  Type II acromion without lateral downsloping.  Abnormal fluid in the subacromial/subdeltoid bursa communicating through the full-thickness rotator cuff tear.  GLENOHUMERAL JOINT:  Superior subluxation of the humeral head in relation to the glenoid, likely related to the rotator cuff pathology.  Small joint effusion.  Mild diffuse chondromalacia/DJD.  No discrete intra-articular body is identified.  The joint capsule is within normal limits.  BONES:  Mild degenerative subcortical cystic changes in the distal clavicle and acromion.  Nonspecific subcortical cystic changes in the greater tuberosity and posterior humeral head.  No fracture.  No concerning bone marrow lesion or marrow replacing process.  LABRUM: No evidence of acute labral tear.  No paralabral cyst.  SOFT TISSUES: No soft tissue mass.  CONCLUSION:  1. Full-thickness, full width tear of the supraspinatus tendon from the footplate with medial retraction. 2. Full-thickness, near full width tear of the subscapularis tendon with medial retraction. 3. Moderate infraspinatus tendinopathy with mild partial-thickness articular sided fraying at the footplate. 4. Mild to moderate intra-articular long head biceps tendinopathy with the tendon dislocated medially from the bicipital groove along the anterior glenohumeral joint space. 5. Mild to moderate glenohumeral joint chondromalacia/DJD with a small joint effusion.  6. Moderate DJD at the AC joint with a small joint effusion.      OMAR BARRIENTOS MD       Electronically Signed and Approved By: OMAR BARRIENTOS MD on 8/30/2021 at 11:46              MRI Shoulder Left Without Contrast     Result Date: 8/30/2021  Narrative: PROCEDURE:       MRI SHOULDER LEFT WO CONTRAST  COMPARISON:  [facility] , CR, XR SCAPULA BILATERAL, 7/14/2021, 12:07.  INDICATIONS:  Left Shoulder Pain ALL OVER CHRONIC  PAIN. NO KNOWN INJURY.  TECHNIQUE:           A variety of imaging planes and parameters were utilized for visualization of suspected pathology.  Images were performed without contrast.   FINDINGS:        ROTATOR CUFF:  Full-thickness, near full width tear of the supraspinatus tendon from the footplate with up to approximately 3 cm medial retraction of the bulk of the articular sided tendon fibers and only a few of the far posterior bursal sided tendon fibers remaining intact on the greater tuberosity.  Moderate to high-grade partial thickness, near full width articular sided tear of the subscapularis tendon from the footplate, involving up to approximately 50 percent of the articular sided tendon fibers and with mild medial retraction of the articular sided tendon fibers.  Mild subscapularis tendinopathy.  Teres minor is intact.  Mild (grade 1/4) fatty atrophy of the rotator cuff muscles.  Mild likely posttraumatic edema in the supraspinatus and infraspinatus muscles.  No myositis.  LONG HEAD BICEPS TENDON:  The intra-articular long head biceps tendon is not identified, suggesting full-thickness tear and distal retraction.  ACROMIOCLAVICULAR ARCH:  Moderate hypertrophic degenerative changes at the acromioclavicular joint with a small joint effusion.  Type II acromion without lateral downsloping.  Abnormal fluid in the subacromial/subdeltoid bursa, communicating through the full-thickness rotator cuff tear.  GLENOHUMERAL JOINT:  Mild superior subluxation of the humeral head in relation to the glenoid, likely related to the rotator cuff pathology.  Small joint effusion.  Mild diffuse chondromalacia/DJD.  No discrete intra-articular body is identified.  The joint capsule is within normal limits.  BONES:  Nonspecific subcortical cystic changes in the greater tuberosity.  No fracture.  No concerning bone marrow lesion or marrow replacing process.  LABRUM:  Degenerative signal and morphology of the superior labrum.  No  paralabral cyst.  SOFT TISSUES: No soft tissue mass.  CONCLUSION:    1. Full-thickness, near full width tear of the supraspinatus tendon from the footplate with medial retraction and only a few of the far posterior bursal sided tendon fibers remaining intact on the greater tuberosity. 2. Moderate to high-grade partial thickness, near full width articular sided tear of the infraspinatus tendon at the footplate. 3. The intra-articular long head biceps tendon is not identified, suggesting full-thickness tear and distal retraction.  4. Mild subscapularis tendinopathy.  New mild glenohumeral joint chondromalacia/DJD with a small joint effusion. 5. Moderate DJD at the AC joint with a small joint effusion.      OMAR BARRIENTOS MD       Electronically Signed and Approved By: OMAR BARRIENTOS MD on 8/30/2021 at 11:41                       Assessment      Assessment and Plan         Problem List Items Addressed This Visit                 Musculoskeletal and Injuries      Complete tear of left rotator cuff - Primary              Follow Up   Return for post op.  Patient Instructions   Dr. Brennan discussed treatment options with patient. Risks and benefits of proceeding with total reverse shoulder arthroplasty, as well as post op care.   Patient wishes to proceed with reverse TSA and will schedule today.   Please call with any changes or concerns. Otherwise, follow up post-operatively.      Patient was given instructions and counseling regarding his condition or for health maintenance advice. Please see specific information pulled into the AVS if appropriate.               Tom Brennan MD  11/23/21

## 2021-11-23 NOTE — ANESTHESIA PREPROCEDURE EVALUATION
Anesthesia Evaluation     Patient summary reviewed and Nursing notes reviewed                Airway   Mallampati: I  TM distance: >3 FB  Neck ROM: full  No difficulty expected  Dental      Pulmonary - negative pulmonary ROS and normal exam    breath sounds clear to auscultation  Cardiovascular - normal exam    Rhythm: regular    (+) hypertension,       Neuro/Psych- negative ROS  GI/Hepatic/Renal/Endo    (+) obesity,  GERD,      Musculoskeletal     Abdominal    Substance History - negative use     OB/GYN negative ob/gyn ROS         Other   arthritis,                      Anesthesia Plan    ASA 3     general with block     intravenous induction     Anesthetic plan, all risks, benefits, and alternatives have been provided, discussed and informed consent has been obtained with: patient.

## 2021-11-24 VITALS
OXYGEN SATURATION: 99 % | SYSTOLIC BLOOD PRESSURE: 127 MMHG | HEART RATE: 70 BPM | TEMPERATURE: 97.9 F | DIASTOLIC BLOOD PRESSURE: 78 MMHG | RESPIRATION RATE: 18 BRPM

## 2021-11-24 LAB
ANION GAP SERPL CALCULATED.3IONS-SCNC: 12.4 MMOL/L (ref 5–15)
BUN SERPL-MCNC: 17 MG/DL (ref 6–20)
BUN/CREAT SERPL: 14 (ref 7–25)
CALCIUM SPEC-SCNC: 8.9 MG/DL (ref 8.6–10.5)
CHLORIDE SERPL-SCNC: 99 MMOL/L (ref 98–107)
CO2 SERPL-SCNC: 22.6 MMOL/L (ref 22–29)
CREAT SERPL-MCNC: 1.21 MG/DL (ref 0.76–1.27)
GFR SERPL CREATININE-BSD FRML MDRD: 61 ML/MIN/1.73
GLUCOSE SERPL-MCNC: 157 MG/DL (ref 65–99)
HCT VFR BLD AUTO: 43.7 % (ref 37.5–51)
HGB BLD-MCNC: 14.8 G/DL (ref 13–17.7)
POTASSIUM SERPL-SCNC: 4.4 MMOL/L (ref 3.5–5.2)
SODIUM SERPL-SCNC: 134 MMOL/L (ref 136–145)

## 2021-11-24 PROCEDURE — 97535 SELF CARE MNGMENT TRAINING: CPT

## 2021-11-24 PROCEDURE — 85018 HEMOGLOBIN: CPT | Performed by: ORTHOPAEDIC SURGERY

## 2021-11-24 PROCEDURE — 80048 BASIC METABOLIC PNL TOTAL CA: CPT | Performed by: ORTHOPAEDIC SURGERY

## 2021-11-24 PROCEDURE — 99213 OFFICE O/P EST LOW 20 MIN: CPT | Performed by: INTERNAL MEDICINE

## 2021-11-24 PROCEDURE — 85014 HEMATOCRIT: CPT | Performed by: ORTHOPAEDIC SURGERY

## 2021-11-24 PROCEDURE — 97165 OT EVAL LOW COMPLEX 30 MIN: CPT

## 2021-11-24 PROCEDURE — 0 CEFAZOLIN IN DEXTROSE 2-4 GM/100ML-% SOLUTION: Performed by: ORTHOPAEDIC SURGERY

## 2021-11-24 PROCEDURE — 97161 PT EVAL LOW COMPLEX 20 MIN: CPT

## 2021-11-24 PROCEDURE — 94799 UNLISTED PULMONARY SVC/PX: CPT

## 2021-11-24 RX ORDER — OXYCODONE AND ACETAMINOPHEN 7.5; 325 MG/1; MG/1
1 TABLET ORAL EVERY 6 HOURS PRN
Qty: 45 TABLET | Refills: 0 | Status: SHIPPED | OUTPATIENT
Start: 2021-11-24

## 2021-11-24 RX ORDER — ASPIRIN 325 MG
325 TABLET, DELAYED RELEASE (ENTERIC COATED) ORAL DAILY
Qty: 28 TABLET | Refills: 0 | Status: SHIPPED | OUTPATIENT
Start: 2021-11-24

## 2021-11-24 RX ORDER — LOSARTAN POTASSIUM 25 MG/1
25 TABLET ORAL DAILY
Status: DISCONTINUED | OUTPATIENT
Start: 2021-11-24 | End: 2021-11-24 | Stop reason: HOSPADM

## 2021-11-24 RX ADMIN — CEFAZOLIN SODIUM 2 G: 2 INJECTION, SOLUTION INTRAVENOUS at 06:40

## 2021-11-24 RX ADMIN — SULFASALAZINE 500 MG: 500 TABLET ORAL at 09:33

## 2021-11-24 RX ADMIN — ASPIRIN 325 MG: 325 TABLET, COATED ORAL at 09:33

## 2021-11-24 RX ADMIN — FAMOTIDINE 40 MG: 20 TABLET, FILM COATED ORAL at 09:33

## 2021-11-24 RX ADMIN — OXYCODONE HYDROCHLORIDE AND ACETAMINOPHEN 2 TABLET: 5; 325 TABLET ORAL at 10:27

## 2021-11-24 RX ADMIN — LOSARTAN POTASSIUM 25 MG: 25 TABLET, FILM COATED ORAL at 09:33

## 2021-11-24 RX ADMIN — SODIUM CHLORIDE, PRESERVATIVE FREE 3 ML: 5 INJECTION INTRAVENOUS at 09:37

## 2021-11-24 NOTE — THERAPY DISCHARGE NOTE
Acute Care - Physical Therapy Treatment Note/Discharge   [place]     Patient Name: Dell Marie  : 1960  MRN: [id number]  Today's Date: 2021                Admit Date: 2021    Visit Dx:    ICD-10-CM ICD-9-CM   1. Decreased activities of daily living (ADL)  Z78.9 V49.89   2. Rotator cuff arthropathy, left  M12.812 716.81   3. Rotator cuff arthropathy of left shoulder  M12.812 716.81   4. Difficulty in walking  R26.2 719.7     Patient Active Problem List   Diagnosis   • Primary osteoarthritis of shoulders, bilateral   • Left shoulder pain   • Complete tear of left rotator cuff   • Rotator cuff arthropathy of left shoulder     Past Medical History:   Diagnosis Date   • Arthritis    • GERD (gastroesophageal reflux disease)    • Hypertension    • Wears glasses      Past Surgical History:   Procedure Laterality Date   • COLONOSCOPY     • JOINT REPLACEMENT Left     knee   • KNEE SURGERY Left     x3   • TOTAL SHOULDER ARTHROPLASTY W/ DISTAL CLAVICLE EXCISION Left 2021    Procedure: LEFT TOTAL SHOULDER REVERSE ARTHROPLASTY;  Surgeon: Tom Brennan MD;  Location: [facility];  Service: Orthopedics;  Laterality: Left;       PT Assessment (last 12 hours)     PT Evaluation and Treatment     Row Name 21 1300          Physical Therapy Time and Intention    Subjective Information no complaints  -ELVIA     Document Type evaluation  -ELVIA     Mode of Treatment individual therapy; physical therapy  -ELVIA     Patient Effort excellent  -ELVIA     Row Name 21 1300          General Information    Patient Observations alert; cooperative; agree to therapy  -ELVIA     Prior Level of Function independent:; all household mobility; community mobility  -ELVIA     Existing Precautions/Restrictions fall; shoulder; non-weight bearing  -ELVIA     Barriers to Rehab none identified  -ELVIA     Row Name 21 1300          Living Environment    Current Living Arrangements home/apartment/condo  -ELVIA     Lives With spouse  -ELVIA      Row Name 11/24/21 1300          Range of Motion (ROM)    Range of Motion ROM is WFL; bilateral lower extremities  -ELVIA     Row Name 11/24/21 1300          Strength (Manual Muscle Testing)    Strength (Manual Muscle Testing) strength is WFL; bilateral lower extremities  -ELVIA     Row Name 11/24/21 1300          Bed Mobility    Bed Mobility bed mobility (all) activities; supine-sit  -ELVIA     All Activities, [place] [facility] (Bed Mobility) independent  -ELVIA     Supine-Sit [facility] [facility] (Bed Mobility) independent  -ELVIA     Row Name 11/24/21 1300          Transfers    Transfers bed-chair transfer; sit-stand transfer  -ELVIA     Bed-Chair [facility] (Transfers) independent  -ELVIA     Sit-Stand [facility] (Transfers) independent  -ELVIA     Row Name 11/24/21 1300          Gait/Stairs (Locomotion)    Gait/Stairs Locomotion gait/ambulation independence  -ELVIA     [place] Level (Gait) independent  -ELVIA     Distance in Feet (Gait) 300  -ELVIA     Negotiation (Stairs) stairs independence  -ELVIA     [place] Level (Stairs) independent  -ELVIA     Handrail Location (Stairs) both sides  -ELVIA     Number of Steps (Stairs) 5x2  -ELVIA     Ascending Technique (Stairs) step-over-step  -ELVIA     Row Name 11/24/21 1300          Balance    Balance Assessment standing dynamic balance  -ELVIA     Dynamic Sitting Balance WFL  -ELVIA     Row Name             Wound 11/23/21 1457 Left shoulder Incision    Wound - Properties Group Placement Date: 11/23/21  -SC Placement Time: 1457  -SC Present on Hospital Admission: N  -SC Side: Left  -SC Location: shoulder  -SC Primary Wound Type: Incision  -SC     Retired Wound - Properties Group Date first assessed: 11/23/21  -SC Time first assessed: 1457  -SC Present on Hospital Admission: N  -SC Side: Left  -SC Location: shoulder  -SC Primary Wound Type: Incision  -SC     Row Name 11/24/21 1300          Plan of Care Review    Plan of Care Reviewed With patient  -ELVIA     Outcome Summary Pt is safe to retun home as he is independent  with mobility.  Recommend follow up with outpatient PT services to address his right shoulder post op care.  -ELVIA     Row Name 11/24/21 1300          Therapy Assessment/Plan (PT)    Patient/Family Therapy Goals Statement (PT) Pt wants to return home indepdently  -ELVIA     Criteria for Skilled Interventions Met (PT) no problems identified which require skilled intervention  pt is discharging with orders for outpatient PT  -ELVIA     Row Name 11/24/21 1300          PT Evaluation Complexity    History, PT Evaluation Complexity no personal factors and/or comorbidities  -ELVIA     Examination of Body Systems (PT Eval Complexity) total of 4 or more elements  -ELVIA     Clinical Presentation (PT Evaluation Complexity) stable  -ELVIA     Clinical Decision Making (PT Evaluation Complexity) low complexity  -ELVIA     Overall Complexity (PT Evaluation Complexity) low complexity  -ELVIA     Row Name 11/24/21 1600          Progress Summary (PT)    Reason for Discharge (PT) patient discharged from this facility  -DP     Row Name 11/24/21 1300          Therapy Plan Review/Discharge Plan (PT)    Therapy Plan Review (PT) evaluation/treatment results reviewed; patient  -ELVIA     Row Name 11/24/21 1600          Discharge Summary (PT)    Additional Documentation Discharge Summary (PT) (Group)  -DP     Row Name 11/24/21 1600          Discharge Summary (PT)    Outcomes Achieved/Progress Made Upon Discharge (PT) goals partially achieved prior to discharge  -DP     Transfer to Another Level of Care or Facility (PT) recommend continued therapy following discharge  -DP           User Key  (r) = Recorded By, (t) = Taken By, (c) = Cosigned By    Initials Name Provider Type    Joyce Helton RN Registered Nurse    Javed Lowery, PT Physical Therapist    Edenilson Shah, PT Physical Therapist                  Physical Therapy Education                 Title: PT OT SLP Therapies (Done)     Topic: Physical Therapy (Done)     Point: Mobility training  (Done)     Learning Progress Summary           Patient Acceptance, E,TB, VU by ELVIA at 11/24/2021 1352                   Point: Home exercise program (Done)     Learning Progress Summary           Patient Acceptance, E,TB, VU by ELVIA at 11/24/2021 1352                   Point: Body mechanics (Done)     Learning Progress Summary           Patient Acceptance, E,TB, VU by ELVIA at 11/24/2021 1352                   Point: Precautions (Done)     Learning Progress Summary           Patient Acceptance, E,TB, VU by ELVIA at 11/24/2021 1352                               User Key     Initials Effective Dates Name Provider Type Discipline    ELVIA 06/03/21 -  Edenilson Dowling PT Physical Therapist PT                PT Recommendation and Plan           Outcome Measures     Row Name 11/24/21 1300             How much help from another person do you currently need...    Turning from your back to your side while in flat bed without using bedrails? 4  -ELVIA      Moving from lying on back to sitting on the side of a flat bed without bedrails? 4  -ELVIA      Moving to and from a bed to a chair (including a wheelchair)? 4  -ELVIA      Standing up from a chair using your arms (e.g., wheelchair, bedside chair)? 4  -ELVIA      Climbing 3-5 steps with a railing? 4  -ELVIA      To walk in hospital room? 4  -ELVIA      AM-PAC 6 Clicks Score (PT) 24  -ELVIA              Functional Assessment    Outcome Measure Options AM-PAC 6 Clicks Basic Mobility (PT)  -ELVIA            User Key  (r) = Recorded By, (t) = Taken By, (c) = Cosigned By    Initials Name Provider Type    ELVIA Edenilson Dowling PT Physical Therapist                 Time Calculation:    PT Charges     Row Name 11/24/21 1348             Time Calculation    PT Received On 11/24/21  -ELVIA              Untimed Charges    PT Eval/Re-eval Minutes 16  -ELVIA              Total Minutes    Untimed Charges Total Minutes 16  -ELVIA       Total Minutes 16  -ELVIA            User Key  (r) = Recorded By, (t) = Taken By, (c) = Cosigned By     Initials Name Provider Type    ELVIA Edenilson Dowling, PT Physical Therapist                  PT G-Codes  Outcome Measure Options: AM-PAC 6 Clicks Basic Mobility (PT)  AM-PAC 6 Clicks Score (PT): 24  AM-PAC 6 Clicks Score (OT): 20         Javed Andres PT  11/24/2021

## 2021-11-24 NOTE — PROGRESS NOTES
"    Orthopedic Total Joint Progress Note        Patient: Dell Marie    Date of Admission: 11/23/2021 11:50 AM    YOB: 1960    Medical Record Number: [id number]    Attending Physician: Tom Brennan MD      POD # 1 Day Post-Op Procedure(s) (LRB):  LEFT TOTAL SHOULDER REVERSE ARTHROPLASTY (Left)       Systemic or Specific Complaints: The patient has had a relatively normal postoperative course.  The patient has had no current complaints. The patient has had improving normal postoperative pain.  The patient has had no issues with the wound..      Allergies:   Allergies   Allergen Reactions   • Lisinopril Cough       Medications:   Current Medications:  Scheduled Meds:aspirin, 325 mg, Oral, Daily  famotidine, 40 mg, Oral, Daily  losartan, 25 mg, Oral, Daily  sodium chloride, 3 mL, Intravenous, Q12H  sulfaSALAzine, 500 mg, Oral, BID      Continuous Infusions:lactated ringers, 100 mL/hr, Last Rate: 100 mL/hr (11/23/21 2108)      PRN Meds:.•  acetaminophen **OR** acetaminophen  •  acetaminophen  •  Morphine **AND** naloxone  •  oxyCODONE-acetaminophen  •  oxyCODONE-acetaminophen  •  promethazine **OR** promethazine  •  senna-docusate sodium  •  sodium chloride      Physical Exam: 61 y.o. male   Wt Readings from Last 3 Encounters:   11/10/21 123 kg (270 lb 8.1 oz)   09/03/21 126 kg (278 lb 9.6 oz)   07/14/21 124 kg (274 lb 6.4 oz)     Ht Readings from Last 3 Encounters:   11/10/21 185.4 cm (73\")   09/03/21 185.4 cm (73\")   07/14/21 185.4 cm (73\")     There is no height or weight on file to calculate BMI.    Vitals:    11/23/21 2313 11/24/21 0011 11/24/21 0439 11/24/21 0700   BP: 125/81  142/88 160/84   BP Location: Right arm  Right arm Right arm   Patient Position: Lying  Lying Lying   Pulse: 58 65 72 68   Resp: 18 16 18 18   Temp: 97.6 °F (36.4 °C)  97.8 °F (36.6 °C) 97.5 °F (36.4 °C)   TempSrc: Oral  Oral Oral   SpO2: 96% 95% 98% 98%        General Appearance:    General: alert and oriented     "     Abdomen/:     soft non-tender, non-distended, voiding without difficulty       Extremities:   Operative extremity neurovascular status intact. ROM appropriate.  Incision intact w/out signs or symptoms of infection.  No cyanosis, calf is soft and nontender.     Activity: Mobilizing Per P.T.   Weight Bearing: As Tolerated    Diagnostic Tests:   Admission on 11/23/2021   Component Date Value Ref Range Status   • Glucose 11/24/2021 157* 65 - 99 mg/dL Final   • BUN 11/24/2021 17  6 - 20 mg/dL Final   • Creatinine 11/24/2021 1.21  0.76 - 1.27 mg/dL Final   • Sodium 11/24/2021 134* 136 - 145 mmol/L Final   • Potassium 11/24/2021 4.4  3.5 - 5.2 mmol/L Final   • Chloride 11/24/2021 99  98 - 107 mmol/L Final   • CO2 11/24/2021 22.6  22.0 - 29.0 mmol/L Final   • Calcium 11/24/2021 8.9  8.6 - 10.5 mg/dL Final   • eGFR Non  Amer 11/24/2021 61  >60 mL/min/1.73 Final   • BUN/Creatinine Ratio 11/24/2021 14.0  7.0 - 25.0 Final   • Anion Gap 11/24/2021 12.4  5.0 - 15.0 mmol/L Final   • Hemoglobin 11/24/2021 14.8  13.0 - 17.7 g/dL Final   • Hematocrit 11/24/2021 43.7  37.5 - 51.0 % Final       Imaging Results (Last 72 Hours)     Procedure Component Value Units Date/Time    XR Shoulder 1 View Left [[id number]] Collected: 11/23/21 1652     Updated: 11/23/21 1655    Narrative:      PROCEDURE: XR SHOULDER 1 VW LEFT     COMPARISON: [facility], MR, MRI SHOULDER LEFT WO CONTRAST, 8/30/2021, 10:03.  [facility]   Orthopedics , CR, SCAPULA LT, 2/24/2020, 14:29.     INDICATIONS: TSA LEFT SHOULDER     FINDINGS:   A reverse type shoulder prosthesis is in place.     No fractures are visualized.     Moderate AC arthropathy is evident.     Skin clips are seen.     CONCLUSION:   Two views of the left shoulder demonstrating reverse type shoulder prosthesis in place.               GLADYS HSIEH MD         Electronically Signed and Approved By: GLADYS HSIEH MD on 11/23/2021 at 16:52                           Personally viewed  ortho images and report     Assessment:  Doing well 1 Day Post-Op following total joint replacement  Acute Blood Loss Anemia, stable  Post-operative Pain  Limited mobility, requires use of walker and assistance when OOB.    Patient Active Problem List   Diagnosis   • Primary osteoarthritis of shoulders, bilateral   • Left shoulder pain   • Complete tear of left rotator cuff   • Rotator cuff arthropathy of left shoulder        Plan:    Consults: Agree with consultant diagnosis and plan of care.  Continue to monitor labs and/or v/s, for tolerance to post op blood loss.  Continue efforts to increase mobilization.  Continue Pain Control Measures.  Continue incisional Care.  DVT prophylaxis.  Follow up in office with jessica LOVE In 2 weeks.    Discharge Plan:today to home and when cleared by physical therapy as safe for discharge    Date: 11/24/2021  Tom Brennan MD

## 2021-11-24 NOTE — THERAPY EVALUATION
Acute Care - Physical Therapy Initial Evaluation   [facility]     Patient Name: Dell Marie  : 1960  MRN: [id number]  Today's Date: 2021     Admit date: 2021     Referring Physician: Curtis Salgado MD     Surgery Date:2021   Procedure(s) (LRB):  LEFT TOTAL SHOULDER REVERSE ARTHROPLASTY (Left)            Visit Dx:     ICD-10-CM ICD-9-CM   1. Decreased activities of daily living (ADL)  Z78.9 V49.89   2. Rotator cuff arthropathy, left  M12.812 716.81   3. Rotator cuff arthropathy of left shoulder  M12.812 716.81   4. Difficulty in walking  R26.2 719.7     Patient Active Problem List   Diagnosis   • Primary osteoarthritis of shoulders, bilateral   • Left shoulder pain   • Complete tear of left rotator cuff   • Rotator cuff arthropathy of left shoulder     Past Medical History:   Diagnosis Date   • Arthritis    • GERD (gastroesophageal reflux disease)    • Hypertension    • Wears glasses      Past Surgical History:   Procedure Laterality Date   • COLONOSCOPY     • JOINT REPLACEMENT Left     knee   • KNEE SURGERY Left     x3   • TOTAL SHOULDER ARTHROPLASTY W/ DISTAL CLAVICLE EXCISION Left 2021    Procedure: LEFT TOTAL SHOULDER REVERSE ARTHROPLASTY;  Surgeon: Tom Brennan MD;  [facility];  Service: Orthopedics;  Laterality: Left;     PT Assessment (last 12 hours)     PT Evaluation and Treatment     Row Name 21 1300          Physical Therapy Time and Intention    Subjective Information no complaints  -ELVIA     Document Type evaluation  -ELVIA     Mode of Treatment individual therapy; physical therapy  -ELVIA     Patient Effort excellent  -ELVIA     Row Name 21 1300          General Information    Patient Observations alert; cooperative; agree to therapy  -ELVIA     Prior Level of Function independent:; all household mobility; community mobility  -ELVIA     Existing Precautions/Restrictions fall; shoulder; non-weight bearing  -ELVIA     Barriers to Rehab none identified  -ELVIA      Row Name 11/24/21 1300          Living Environment    Current Living Arrangements home/apartment/condo  -ELVIA     Lives With spouse  -ELVIA     Row Name 11/24/21 1300          Range of Motion (ROM)    Range of Motion ROM is WFL; bilateral lower extremities  -ELVIA     Row Name 11/24/21 1300          Strength (Manual Muscle Testing)    Strength (Manual Muscle Testing) strength is WFL; bilateral lower extremities  -ELVIA     Row Name 11/24/21 1300          Bed Mobility    Bed Mobility bed mobility (all) activities; supine-sit  -ELVIA     All Activities, [place] (Bed Mobility) independent  -ELVIA     Supine-Sit [place] (Bed Mobility) independent  -ELVIA     Row Name 11/24/21 1300          Transfers    Transfers bed-chair transfer; sit-stand transfer  -ELVIA     Bed-Chair [place] (Transfers) independent  -ELVIA     Sit-Stand [place] (Transfers) independent  -ELVIA     Row Name 11/24/21 1300          Gait/Stairs (Locomotion)    Gait/Stairs Locomotion gait/ambulation independence  -ELVIA     [place] Level (Gait) independent  -ELVIA     Distance in Feet (Gait) 300  -ELVIA     Negotiation (Stairs) stairs independence  -ELVIA     [place] Level (Stairs) independent  -ELVIA     Handrail Location (Stairs) both sides  -ELVIA     Number of Steps (Stairs) 5x2  -ELVIA     Ascending Technique (Stairs) step-over-step  -ELVIA     Row Name 11/24/21 1300          Balance    Balance Assessment standing dynamic balance  -ELVIA     Dynamic Sitting Balance WFL  -ELVIA     Row Name             Wound 11/23/21 1457 Left shoulder Incision    Wound - Properties Group Placement Date: 11/23/21  -SC Placement Time: 1457  -SC Present on Hospital Admission: N  -SC Side: Left  -SC Location: shoulder  -SC Primary Wound Type: Incision  -SC     Retired Wound - Properties Group Date first assessed: 11/23/21  -SC Time first assessed: 1457  -SC Present on Hospital Admission: N  -SC Side: Left  -SC Location: shoulder  -SC Primary Wound Type: Incision  -SC     Row Name 11/24/21 1300           Plan of Care Review    Plan of Care Reviewed With patient  -ELVIA     Outcome Summary Pt is safe to retun home as he is independent with mobility.  Recommend follow up with outpatient PT services to address his right shoulder post op care.  -ELVIA     Row Name 11/24/21 1300          Therapy Assessment/Plan (PT)    Patient/Family Therapy Goals Statement (PT) Pt wants to return home indepdently  -ELVIA     Criteria for Skilled Interventions Met (PT) no problems identified which require skilled intervention  pt is discharging with orders for outpatient PT  -ELVIA     Row Name 11/24/21 1300          PT Evaluation Complexity    History, PT Evaluation Complexity no personal factors and/or comorbidities  -ELVIA     Examination of Body Systems (PT Eval Complexity) total of 4 or more elements  -ELVIA     Clinical Presentation (PT Evaluation Complexity) stable  -ELVIA     Clinical Decision Making (PT Evaluation Complexity) low complexity  -ELVIA     Overall Complexity (PT Evaluation Complexity) low complexity  -ELVIA     Row Name 11/24/21 1300          Therapy Plan Review/Discharge Plan (PT)    Therapy Plan Review (PT) evaluation/treatment results reviewed; patient  -ELVIA           User Key  (r) = Recorded By, (t) = Taken By, (c) = Cosigned By    Initials Name Provider Type    Joyce Helton, RN Registered Nurse    Edenilson Shah, PT Physical Therapist                Physical Therapy Education                 Title: PT OT SLP Therapies (Done)     Topic: Physical Therapy (Done)     Point: Mobility training (Done)     Learning Progress Summary           Patient Acceptance, E,TB, VU by ELVIA at 11/24/2021 1352                   Point: Home exercise program (Done)     Learning Progress Summary           Patient Acceptance, E,TB, VU by ELVIA at 11/24/2021 1352                   Point: Body mechanics (Done)     Learning Progress Summary           Patient Acceptance, E,TB, VU by ELVIA at 11/24/2021 1352                   Point: Precautions (Done)      Learning Progress Summary           Patient Acceptance, E,TB, VU by ELVIA at 11/24/2021 1352                               User Key     Initials Effective Dates Name Provider Type Discipline    ELVIA 06/03/21 -  Edenilson Dowling PT Physical Therapist PT              PT Recommendation and Plan  Anticipated Discharge Disposition (PT): home with outpatient therapy services  Plan of Care Reviewed With: patient  Outcome Summary: Pt is safe to retun home as he is independent with mobility.  Recommend follow up with outpatient PT services to address his right shoulder post op care.   Outcome Measures     Row Name 11/24/21 1300             How much help from another person do you currently need...    Turning from your back to your side while in flat bed without using bedrails? 4  -ELVIA      Moving from lying on back to sitting on the side of a flat bed without bedrails? 4  -ELVIA      Moving to and from a bed to a chair (including a wheelchair)? 4  -ELVIA      Standing up from a chair using your arms (e.g., wheelchair, bedside chair)? 4  -ELVIA      Climbing 3-5 steps with a railing? 4  -ELVIA      To walk in hospital room? 4  -ELVIA      AM-PAC 6 Clicks Score (PT) 24  -ELVIA              Functional Assessment    Outcome Measure Options AM-PAC 6 Clicks Basic Mobility (PT)  -ELVIA            User Key  (r) = Recorded By, (t) = Taken By, (c) = Cosigned By    Initials Name Provider Type    Edenilson Shah PT Physical Therapist                 Time Calculation:    PT Charges     Row Name 11/24/21 1348             Time Calculation    PT Received On 11/24/21  -ELVIA              Untimed Charges    PT Eval/Re-eval Minutes 16  -ELVIA              Total Minutes    Untimed Charges Total Minutes 16  -ELVIA       Total Minutes 16  -ELVIA            User Key  (r) = Recorded By, (t) = Taken By, (c) = Cosigned By    Initials Name Provider Type    Edenilson Shah PT Physical Therapist              Therapy Charges for Today     Code Description Service Date Service  Provider Modifiers Qty    [id number] HC PT EVAL LOW COMPLEXITY 2 11/24/2021 Edenilson Dowling, PT GP 1          PT G-Codes  Outcome Measure Options: AM-PAC 6 Clicks Basic Mobility (PT)  AM-PAC 6 Clicks Score (PT): 24  AM-PAC 6 Clicks Score (OT): 20    Edenilson Dowling, LINDSAY  11/24/2021

## 2021-11-24 NOTE — PLAN OF CARE
Goal Outcome Evaluation:  Plan of Care Reviewed With: patient        Progress: improving (Through skilled ADL/transfer retraining, patient is now able to complete bathing CGA/dressing min assist with cues for safe positioning and adaptive techniques.)  Outcome Summary: Patient presents with limitations of range of motion, safety awareness, balance, coordination, endurance/activity tolerance which impede his ability to perform ADL and transfers as prior.  The skills of a therapist will be required to safely and effectively implement treatment plan to restore maximal level of function.

## 2021-11-24 NOTE — PLAN OF CARE
Goal Outcome Evaluation:      Post-op total shoulder, no complaints of pain, tolerating PO intake, voiding spontaneously, possible dc home today. Bibiana Klein RN

## 2021-11-24 NOTE — THERAPY EVALUATION
Patient Name: Dell Marie  : 1960    MRN: [id number]                              Today's Date: 2021       Admit Date: 2021    Visit Dx:     ICD-10-CM ICD-9-CM   1. Decreased activities of daily living (ADL)  Z78.9 V49.89   2. Rotator cuff arthropathy, left  M12.812 716.81     Patient Active Problem List   Diagnosis   • Primary osteoarthritis of shoulders, bilateral   • Left shoulder pain   • Complete tear of left rotator cuff   • Rotator cuff arthropathy of left shoulder     Past Medical History:   Diagnosis Date   • Arthritis    • GERD (gastroesophageal reflux disease)    • Hypertension    • Wears glasses      Past Surgical History:   Procedure Laterality Date   • COLONOSCOPY     • JOINT REPLACEMENT Left     knee   • KNEE SURGERY Left     x3   • TOTAL SHOULDER ARTHROPLASTY W/ DISTAL CLAVICLE EXCISION Left 2021    Procedure: LEFT TOTAL SHOULDER REVERSE ARTHROPLASTY;  Surgeon: Tom Brennan MD;  Location: [facility];  Service: Orthopedics;  Laterality: Left;      General Information     Row Name 21 1144 21 1125       OT Time and Intention    Document Type therapy note (daily note)  -AV evaluation  -AV    Mode of Treatment individual therapy; occupational therapy  -AV individual therapy; occupational therapy  -AV    Row Name 21 1144 21 1125       General Information    Patient Profile Reviewed -- yes  -AV    Prior Level of Function -- independent:; ADL's; home management; all household mobility  Stands to shower/tub without DME.  Stands at sink to groom.  Regular commode.  Ambulates without assistive device.  No home oxygen.  -AV    Existing Precautions/Restrictions -- fall; shoulder; non-weight bearing  Nonweightbearing left upper extremity.  Shoulder immobilizer.  No shoulder flexion/abduction over 90 degrees/no rotation.  -AV    Barriers to Rehab --  Patient voiced resistance to adherence to shoulder precautions/safe techniques  -AV cognitive status   Impaired insight into limitations, impaired safety awareness/receptiveness to precautions  -AV    Row Name 11/24/21 1125          Occupational Profile    Reason for Services/Referral (Occupational Profile) Patient is a 61 year old male admitted to [facility] on November 23, 2021 with left shoulder pain.  He is currently postop day 1: Left total shoulder reverse arthroplasty.  He is on 2 N/room air.   OT consulted due to recent decline in ADL/transfer independence.  No previous OT services for current condition.  -AV     Row Name 11/24/21 1125          Living Environment    Lives With alone  -AV     Row Name 11/24/21 1125          Home Main Entrance    Number of Stairs, Main Entrance none  -AV     Row Name 11/24/21 1144 11/24/21 1125       Cognition    Orientation Status (Cognition) --  Patient requires mod/max cues for safe positioning, ADL safety, shoulder precautions throughout session.  Dismissive at times.  -AV --  Alert.  Able to retain information and follow commands when desired.  Decreased insight into limitations/impulsive despite cues.  Impaired attention to task.  -AV    Row Name 11/24/21 1144 11/24/21 1125       Safety Issues, Functional Mobility    Safety Issues Affecting Function (Mobility) -- impulsivity; at risk behavior observed; insight into deficits/self-awareness; safety precaution awareness  -AV    Impairments Affecting Function (Mobility) balance; cognition; endurance/activity tolerance  -AV balance; cognition; endurance/activity tolerance  -AV          User Key  (r) = Recorded By, (t) = Taken By, (c) = Cosigned By    Initials Name Provider Type    AV Michael Hernandez OT Occupational Therapist                 Mobility/ADL's     Row Name 11/24/21 1146 11/24/21 1129       Bed Mobility    Bed Mobility supine-sit  -AV supine-sit  -AV    Supine-Sit [place] (Bed Mobility) independent; verbal cues  In preparation for ADL tasks upright  -AV independent; verbal cues  -AV    Row Name  11/24/21 1146 11/24/21 1129       Transfers    Transfers -- sit-stand transfer; bed-chair transfer  -AV    Bed-Chair [place] (Transfers) contact guard; verbal cues  Moderate cues required for safe techniques.  Transfer bathroom to chair x2  -AV contact guard; verbal cues  For safe technique/adherence to shoulder precautions  -AV    Sit-Stand [place] (Transfers) contact guard; verbal cues  X6 during functional ADL session  -AV contact guard; verbal cues  For safe technique/adherence to shoulder precautions  -AV    Row Name 11/24/21 1146 11/24/21 1129       Functional Mobility    Functional Mobility- Ind. Level contact guard assist; verbal cues required  Impaired safety awareness  -AV contact guard assist; verbal cues required  -AV    Row Name 11/24/21 1146 11/24/21 1129       Activities of Daily Living    BADL Assessment/Intervention upper body dressing  (I) UB bath/CGA LB bath:cues safe positioning & adaptive tech. Min assist don pullover shirt/cues adaptive technique.  Min assist don jeans (manipulate fasteners, pull over left hip). (I) don slip on shoes.Max assist don/doff immobilizer .  -AV --  (I) feeding/grooming with set up while seated.  CGA bathing/set up while seated/cues for safe positioning, adaptive techniques.  Min assist UB dressing/LB dressing with cues adaptive techniques/safe positioning.  Toilet hygiene min assist/safety cues.  -AV    Row Name 11/24/21 1146 11/24/21 1129       Mobility    Extremity Weight-bearing Status left upper extremity  -AV left upper extremity  -AV    Left Upper Extremity (Weight-bearing Status) non weight-bearing (NWB)  -AV non weight-bearing (NWB)  -AV    Row Name 11/24/21 1146          Upper Body Dressing Assessment/Training    Assistive Devices (Upper Body Dressing) --  Adjusted LUE immobilizer for proper fit.  Trained on wear schedule, donning/doffing.  -AV           User Key  (r) = Recorded By, (t) = Taken By, (c) = Cosigned By    Initials Name Provider Type     AV Michael Hernandez OT Occupational Therapist               Obj/Interventions     Row Name 11/24/21 1132          Sensory Assessment (Somatosensory)    Sensory Assessment (Somatosensory) --  Questionable: Inconsistent responses.  Patient did respond to light touch left hand at times.  -AV     Row Name 11/24/21 1132          Vision Assessment/Intervention    Visual Impairment/Limitations WFL; corrective lenses full-time  -AV     Row Name 11/24/21 1132          Range of Motion Comprehensive    General Range of Motion upper extremity range of motion deficits identified  RUE WFL.Patient able to oppose left #2 only.  90% left fisting.  No AROM elbow/shoulder.  -AV     Row Name 11/24/21 1132          Strength Comprehensive (MMT)    Comment, General Manual Muscle Testing (MMT) Assessment 5/5 RUE.  3/5 left /further LUE testing deferred  -AV     Row Name 11/24/21 1132          Motor Skills    Motor Skills coordination; functional endurance  Right dominant-impaired  -AV     Functional Endurance Fair  -AV     Row Name 11/24/21 1152 11/24/21 1132       Balance    Balance Assessment sitting dynamic balance; standing static balance  -AV sitting dynamic balance; standing static balance  -AV    Dynamic Sitting Balance WFL  -AV WFL  -AV    Static Standing Balance mild impairment  CGA/cues  -AV mild impairment  CGA/impulsive  -AV    Balance Interventions sitting; standing; sit to stand; minimal challenge; occupation based/functional task  -AV --          User Key  (r) = Recorded By, (t) = Taken By, (c) = Cosigned By    Initials Name Provider Type    Michael Chris OT Occupational Therapist               Goals/Plan     Row Name 11/24/21 1139          Transfer Goal 1 (OT)    Activity/Assistive Device (Transfer Goal 1, OT) transfers, all  -AV     [place] Level/Cues Needed (Transfer Goal 1, OT) modified independence  -AV     Time Frame (Transfer Goal 1, OT) long term goal (LTG); 10 days  -AV     Row Name 11/24/21 1136           Bathing Goal 1 (OT)    Activity/Device (Bathing Goal 1, OT) bathing skills, all; tub bench  -AV     [place] Level/Cues Needed (Bathing Goal 1, OT) modified independence  -AV     Time Frame (Bathing Goal 1, OT) long term goal (LTG); 10 days  -AV     Row Name 11/24/21 1139          Dressing Goal 1 (OT)    Activity/Device (Dressing Goal 1, OT) dressing skills, all  -AV     [place]/Cues Needed (Dressing Goal 1, OT) modified independence  -AV     Time Frame (Dressing Goal 1, OT) long term goal (LTG); 10 days  -AV     Row Name 11/24/21 1139          Toileting Goal 1 (OT)    Activity/Device (Toileting Goal 1, OT) toileting skills, all; raised toilet seat  -AV     [place] Level/Cues Needed (Toileting Goal 1, OT) modified independence  -AV     Time Frame (Toileting Goal 1, OT) long term goal (LTG); 10 days  -AV     Row Name 11/24/21 1139          Grooming Goal 1 (OT)    Activity/Device (Grooming Goal 1, OT) grooming skills, all  Standing at sink  -AV     [place] (Grooming Goal 1, OT) modified independence  -AV     Time Frame (Grooming Goal 1, OT) long term goal (LTG); 10 days  -AV     Row Name 11/24/21 1139          ROM Goal 1 (OT)    ROM Goal 1 (OT) Patient will demonstrate 90 degrees left active shoulder flexion to increase independence with ADLs.  -AV     Time Frame (ROM Goal 1, OT) long term goal (LTG); 10 days  -AV     Row Name 11/24/21 1139          Problem Specific Goal 1 (OT)    Problem Specific Goal 1 (OT) Patient will adhere to left shoulder precautions during ADLs/transfers.  -AV     Time Frame (Problem Specific Goal 1, OT) long term goal (LTG); 10 days  -AV     Row Name 11/24/21 1139          Therapy Assessment/Plan (OT)    Planned Therapy Interventions (OT) activity tolerance training; BADL retraining; IADL retraining; occupation/activity based interventions; patient/caregiver education/training; transfer/mobility retraining  -AV           User Key  (r) = Recorded By, (t) = Taken By,  (c) = Cosigned By    Initials Name Provider Type    AV Michael Hernandez, OT Occupational Therapist               Clinical Impression     Row Name 11/24/21 1136          Pain Assessment    Additional Documentation Pain Scale: Numbers Pre/Post-Treatment (Group); Pain Scale: FACES Pre/Post-Treatment (Group)  -AV     Row Name 11/24/21 1136          Pain Scale: FACES Pre/Post-Treatment    Pain: FACES Scale, Pretreatment 2-->hurts little bit  -AV     Posttreatment Pain Rating 2-->hurts little bit  -AV     Pain Location - Side Left  -AV     Pain Location --  Shoulder  -AV     Row Name 11/24/21 1136          Plan of Care Review    Plan of Care Reviewed With patient  -AV     Progress improving  Through skilled ADL/transfer retraining, patient is now able to complete bathing CGA/dressing min assist with cues for safe positioning and adaptive techniques.  -AV     Outcome Summary Patient presents with limitations of range of motion, safety awareness, balance, coordination, endurance/activity tolerance which impede his ability to perform ADL and transfers as prior.  The skills of a therapist will be required to safely and effectively implement treatment plan to restore maximal level of function.  -AV     Row Name 11/24/21 1136          Therapy Assessment/Plan (OT)    Patient/Family Therapy Goal Statement (OT) Discharge home today  -AV     Rehab Potential (OT) good, to achieve stated therapy goals  -AV     Criteria for Skilled Therapeutic Interventions Met (OT) yes; meets criteria; skilled treatment is necessary  -AV     Therapy Frequency (OT) 5 times/wk  -AV     Row Name 11/24/21 1136          Therapy Plan Review/Discharge Plan (OT)    Equipment Needs Upon Discharge (OT) tub bench; commode chair  Patient reports family has DME available to borrow.  -AV     Anticipated Discharge Disposition (OT) home with assist; home with outpatient therapy services  Outpatient OT  -AV     Row Name 11/24/21 1136          Vital Signs    O2 Delivery  Pre Treatment room air  -AV     O2 Delivery Intra Treatment room air  -AV     O2 Delivery Post Treatment room air  -AV           User Key  (r) = Recorded By, (t) = Taken By, (c) = Cosigned By    Initials Name Provider Type    Michael Chris OT Occupational Therapist               Outcome Measures     Row Name 11/24/21 1141          How much help from another is currently needed...    Putting on and taking off regular lower body clothing? 3  -AV     Bathing (including washing, rinsing, and drying) 3  -AV     Toileting (which includes using toilet bed pan or urinal) 3  -AV     Putting on and taking off regular upper body clothing 3  -AV     Taking care of personal grooming (such as brushing teeth) 4  -AV     Eating meals 4  -AV     AM-PAC 6 Clicks Score (OT) 20  -AV     Row Name 11/24/21 1141          Functional Assessment    Outcome Measure Options AM-PAC 6 Clicks Daily Activity (OT); Optimal Instrument  -AV     Row Name 11/24/21 1141          Optimal Instrument    Optimal Instrument Optimal - 3  -AV     Bending/Stooping 1  -AV     Standing 2  -AV     Reaching 3  -AV     From the list, choose the 3 activities you would most like to be able to do without any difficulty Bending/stooping; Standing; Reaching  -AV     Total Score Optimal - 3 6  -AV           User Key  (r) = Recorded By, (t) = Taken By, (c) = Cosigned By    Initials Name Provider Type    Michael Chris OT Occupational Therapist                Occupational Therapy Education                 Title: PT OT SLP Therapies (Done)     Topic: Occupational Therapy (Done)     Point: ADL training (Done)     Description:   Instruct learner(s) on proper safety adaptation and remediation techniques during self care or transfers.   Instruct in proper use of assistive devices.              Learning Progress Summary           Patient Acceptance, E,D, VU,NR by LAY at 11/24/2021 1142    Comment: Nonweightbearing left upper extremity  Shoulder precautions  Left upper  extremity immobilizer-don/doffing, wear schedule  Need for staff assistance for all standing ADLs/transfers  ADL Home safety  Adaptive equipment recommendations                   Point: Home exercise program (Done)     Description:   Instruct learner(s) on appropriate technique for monitoring, assisting and/or progressing therapeutic exercises/activities.              Learning Progress Summary           Patient Acceptance, E,D, VU,NR by AV at 11/24/2021 1142    Comment: Nonweightbearing left upper extremity  Shoulder precautions  Left upper extremity immobilizer-don/doffing, wear schedule  Need for staff assistance for all standing ADLs/transfers  ADL Home safety  Adaptive equipment recommendations                   Point: Precautions (Done)     Description:   Instruct learner(s) on prescribed precautions during self-care and functional transfers.              Learning Progress Summary           Patient Acceptance, E,D, VU,NR by AV at 11/24/2021 1142    Comment: Nonweightbearing left upper extremity  Shoulder precautions  Left upper extremity immobilizer-don/doffing, wear schedule  Need for staff assistance for all standing ADLs/transfers  ADL Home safety  Adaptive equipment recommendations                   Point: Body mechanics (Done)     Description:   Instruct learner(s) on proper positioning and spine alignment during self-care, functional mobility activities and/or exercises.              Learning Progress Summary           Patient Acceptance, E,D, VU,NR by AV at 11/24/2021 1142    Comment: Nonweightbearing left upper extremity  Shoulder precautions  Left upper extremity immobilizer-don/doffing, wear schedule  Need for staff assistance for all standing ADLs/transfers  ADL Home safety  Adaptive equipment recommendations                               User Key     Initials Effective Dates Name Provider Type Discipline    AV 06/16/21 -  Michael Hernandez OT Occupational Therapist OT              OT Recommendation  and Plan  Planned Therapy Interventions (OT): activity tolerance training, BADL retraining, IADL retraining, occupation/activity based interventions, patient/caregiver education/training, transfer/mobility retraining  Therapy Frequency (OT): 5 times/wk  Plan of Care Review  Plan of Care Reviewed With: patient  Progress: improving (Through skilled ADL/transfer retraining, patient is now able to complete bathing CGA/dressing min assist with cues for safe positioning and adaptive techniques.)  Outcome Summary: Patient presents with limitations of range of motion, safety awareness, balance, coordination, endurance/activity tolerance which impede his ability to perform ADL and transfers as prior.  The skills of a therapist will be required to safely and effectively implement treatment plan to restore maximal level of function.     Time Calculation:    Time Calculation- OT     Row Name 11/24/21 1144             Time Calculation- OT    OT Received On 11/24/21  -AV      OT Goal Re-Cert Due Date 12/03/21  -AV              Timed Charges    18681 - OT Self Care/Mgmt Minutes 40  -AV              Untimed Charges    OT Eval/Re-eval Minutes 35  -AV              Total Minutes    Timed Charges Total Minutes 40  -AV      Untimed Charges Total Minutes 35  -AV       Total Minutes 75  -AV            User Key  (r) = Recorded By, (t) = Taken By, (c) = Cosigned By    Initials Name Provider Type    AV Michael Hernandez OT Occupational Therapist              Therapy Charges for Today     Code Description Service Date Service Provider Modifiers Qty    [id number]  OT SELF CARE/MGMT/TRAIN EA 15 MIN 11/24/2021 Michael Hernandez OT GO 3    [id number] HC OT EVAL LOW COMPLEXITY 3 11/24/2021 Michael Hernandez OT GO 1               Michael Hernandez OT  11/24/2021

## 2021-11-24 NOTE — PLAN OF CARE
Goal Outcome Evaluation:  Plan of Care Reviewed With: patient           Outcome Summary: Pt is safe to retun home as he is independent with mobility.  Recommend follow up with outpatient PT services to address his right shoulder post op care.

## 2021-11-24 NOTE — DISCHARGE SUMMARY
[facility]         HOSPITALIST  DISCHARGE SUMMARY    Patient Name: Dell Marie  : 1960  MRN: [id number]    Date of Admission: 2021  Date of Discharge: 2021  Primary Care Physician: Rohit Coffey DO    Consults     Date and Time Order Name Status Description    2021  4:49 PM Inpatient Hospitalist Consult            Active and Resolved Hospital Problems:  Active Hospital Problems    Diagnosis POA   • **Rotator cuff arthropathy of left shoulder [M12.812] Unknown      Resolved Hospital Problems   No resolved problems to display.   Left shoulder osteoarthritis status post total shoulder arthroscopy  Postoperative hypotension  Postoperative nausea/vomiting  Questionable rheumatoid arthritis  Hypertension  GERD    Hospital Course     Hospital Course:  Dell Marie is a 61 y.o. male with PMH osteoarthritis, questionable rheumatoid arthritis, GERD, hypertension who presents for scheduled left shoulder replacement.  Patient states that prior to the procedure the pain had gradually been worsening since the  after he tore his rotator cuff.  He underwent elective left shoulder replacement.  He tolerated the procedure well and his pain was well controlled the following morning with oral narcotics.  He worked well physical therapy was discharged home in stable condition on 2021.  Recommend follow-up with PCP in 1 week, orthopedic surgery in 3 weeks as scheduled.      DISCHARGE Follow Up Recommendations for labs and diagnostics: Monitor blood pressure at next appointment and consider uptitrating medications      Day of Discharge     Vital Signs:  Temp:  [96.2 °F (35.7 °C)-97.9 °F (36.6 °C)] 97.9 °F (36.6 °C)  Heart Rate:  [56-82] 70  Resp:  [13-20] 18  BP: ()/(41-88) 127/78  Flow (L/min):  [1-3] 2  Physical Exam:   Gen: NAD, WDWN  ENT: PERRL, EOMI   CV: RRR no MRG  Pulm: CTAB, no w/r/r  GI: Abd soft, NTND, +bs  Neuro: Left arm in sling, otherwise moving all  extremities spontaneously, CN II-XII grossly intact   Psych: A&O*3, normal mood and affect  Skin: No lesions or rashes noted      Discharge Details        Discharge Medications      New Medications      Instructions Start Date   aspirin  MG tablet   325 mg, Oral, Daily      oxyCODONE-acetaminophen 7.5-325 MG per tablet  Commonly known as: PERCOCET   1 tablet, Oral, Every 6 Hours PRN         Continue These Medications      Instructions Start Date   losartan 25 MG tablet  Commonly known as: COZAAR   25 mg, Oral, Daily      omeprazole 20 MG capsule  Commonly known as: priLOSEC   20 mg, Oral, Daily      sulfaSALAzine 500 MG tablet  Commonly known as: AZULFIDINE   500 mg, Oral, 2 Times Daily         Stop These Medications    meloxicam 15 MG tablet  Commonly known as: MOBIC            Allergies   Allergen Reactions   • Lisinopril Cough       Discharge Disposition:  Home or Self Care    Diet:  Hospital:  Diet Order   Procedures   • Diet Regular       Discharge Activity:   Activity Instructions     Activity as Tolerated            CODE STATUS:  Code Status and Medical Interventions:   Ordered at: 11/23/21 1800     Level Of Support Discussed With:    Patient     Code Status (Patient has no pulse and is not breathing):    CPR (Attempt to Resuscitate)     Medical Interventions (Patient has pulse or is breathing):    Full Support         Future Appointments   Date Time Provider Department Center   12/6/2021  9:30 AM Gill Sloan PA [facility]S [facility]       Additional Instructions for the Follow-ups that You Need to Schedule     Discharge Follow-up with PCP   As directed       Currently Documented PCP:    Rohit Coffey DO    PCP Phone Number:    [phone]     Follow Up Details: 3-5 days         Discharge Follow-up with Specified Provider: Orthopedic surgery; 3 Weeks   As directed      To: Orthopedic surgery    Follow Up: 3 Weeks               Pertinent  and/or Most Recent Results     PROCEDURES:   Left total  shoulder arthroplasty    LAB RESULTS:      Lab 11/24/21  0509   HEMOGLOBIN 14.8   HEMATOCRIT 43.7         Lab 11/24/21  0509   SODIUM 134*   POTASSIUM 4.4   CHLORIDE 99   CO2 22.6   ANION GAP 12.4   BUN 17   CREATININE 1.21   GLUCOSE 157*   CALCIUM 8.9                         Brief Urine Lab Results     None        Microbiology Results (last 10 days)     ** No results found for the last 240 hours. **                           Labs Pending at Discharge:        Time spent on Discharge including face to face service:  33 minutes    Electronically signed by Curtis Martel MD, 11/24/21, 1:24 PM EST.

## 2021-11-24 NOTE — OP NOTE
TOTAL SHOULDER REVERSE ARTHROPLASTY  Procedure Report    Patient Name:  Dell Marie  YOB: 1960    Date of Surgery:  11/23/2021       Pre-op Diagnosis:   Rotator cuff arthropathy, left [M12.812]       Post-Op Diagnosis Codes:     * Rotator cuff arthropathy, left [M12.812]    Procedure/CPT® Codes:      Procedure(s):  LEFT TOTAL SHOULDER REVERSE ARTHROPLASTY    Staff:  Surgeon(s):  Tom Brennan MD    Assistant: Xochitl Stoll RN; Bryant Nelson    Anesthesia: General    Estimated Blood Loss: 100ml    Implants:    Implant Name Type Inv. Item Serial No.  Lot No. LRB No. Used Action   BASEPLT KATHERINE COMPR MINI W TPR ADAPTR 25 - [id number] Implant BASEPLT KATHERINE COMPR MINI W TPR ADAPTR 25  [employer] [id number] Left 1 Implanted   GLENOSPHERE VERSA DIAL FIX STD 36MM - [id number] Implant GLENOSPHERE VERSA DIAL FIX STD 36MM  [employer] [id number] Left 1 Implanted   SCRW COMPRNSV CNTRL 6.5X35MM REUS - [id number] Implant SCRW COMPRNSV CNTRL 6.5X35MM REUS  [employer] [id number] Left 1 Implanted   SCRW FIX LK HEX 4.40N07ZK - [id number] Implant SCRW FIX LK HEX 4.66R45FC  [employer] [id number] Left 1 Implanted   SCRW FIX LK HEX 4.71F80YN - [id number] Implant SCRW FIX LK HEX 4.03R31XA  [employer] [id number] Left 1 Implanted   SCRW FIX LK HEX 4.27S96GB - [id number] Implant SCRW FIX LK HEX 4.30Y71UE  [employer] [id number] Left 1 Implanted   SCRW FIX LK HEX 4.82K09RE - [id number] Implant SCRW FIX LK HEX 4.28P90TT  [employer] [id number] Left 1 Implanted   STEM HUM COMPRNSV MIR MINI 11MM - [id number] Implant STEM HUM COMPRNSV MIR MINI 11MM  [employer] [id number] Left 1 Implanted   BEAR HUM PROLNG STD 36MM - [id number] Implant BEAR HUM PROLNG STD 36MM  [employer] [id number] Left 1 Implanted   TRY HUM STD 40MM - [id number] Implant TRY HUM STD 40MM  [employer] [id number] Left 1 Implanted   TOTL SHLDER REV - [id number] Implant TOTL SHLDER REV  [employer]  Left 1 Implanted       Specimen:           None      Complications: None    Description of Procedure:   The patient was taken to the operating room and placed supine on the table after interscalene block was done in preoperative holding.  After general endotracheal anesthesia was established, the patient was placed in the beach chair position.  The left shoulder was examined. 160 he had 160 degrees of forward flexion and 50 degrees of external rotation, and 60 degrees of internal rotation compared to the normal side.  The left shoulder and upper extremity were prepped and draped in the standard usual fashion using alcohol and ChloraPrep.  A standard 10 cm deltopectoral incision was made.  Dissection was carried down to the mid deltopectoral groove.  The cephalic vein was mobilized medially and protected throughout the case.  The deep retractors were placed after incising lateral border of the coracobrachialis tendon along the its lateral border.  The superior third of the pectoralis major tendon was released.  An anterior capsulotomy was then done and the proximal humerus was dislocated and delivered through the wound.  There was evidence of significant rotator cuff tearing and glenohumeral arthrosis.  The canal was sequentially hand-reamed to accommodate a size 10 reamer, then the intramedullary cutting block was placed over the  and the proximal humerus cut was made in appropriate version.  Then the canal was sequentially broached to accommodate a size 11 Biomet mini stem trial and then the glenoid was exposed.  There was significant wear of the glenoid.  The labrum was removed.  The central guide pin was placed in the appropriate position. Then the glenoid was reamed and then the baseplate was inserted and impacted over the guide pin.  Excellent fixation was achieved with just initial impaction and then the central cortical screw was placed with appropriate depth and the locking screws were filled in all four quadrants.  Then the glenosphere  was implanted in the appropriate orientation and then trials were undertaken.  The trial broach was removed.  The canal was copiously irrigated and dried and the size 11 Biomet mini stem was implanted in the same version as the trials.  Then trials were again undertaken and a standard tray and standard poly was chosen to be the best fit.  It was implanted.  The shoulder was relocated, taken through a range of motion; it was stable throughout all planes, including extension and external rotation, and was not overstuffed.  The wound was then copiously irrigated with Irrisept bacitracin Simpulse irrigation.  The deltopectoral groove was closed with 0 Vicryl.  The deep fat was closed with 0 Vicryl.  The subcut was closed with 2-0 Vicryl.  The skin was closed with staples.  The incisions were washed and dried, and sterile dressings were applied.  The patient was placed on an abduction pillow in a sling, tolerated the procedure well, was extubated and taken to the recovery room.           Tom Brennan MD     Date: 11/24/2021  Time: 08:03 EST

## 2021-11-26 NOTE — THERAPY DISCHARGE NOTE
Acute Care - Occupational Therapy Discharge   [facility]     Patient Name: Dell Marie  : 1960  MRN: [id number]  Today's Date: 2021               Admit Date: 2021       ICD-10-CM ICD-9-CM   1. Decreased activities of daily living (ADL)  Z78.9 V49.89   2. Rotator cuff arthropathy, left  M12.812 716.81   3. Rotator cuff arthropathy of left shoulder  M12.812 716.81   4. Difficulty in walking  R26.2 719.7     Patient Active Problem List   Diagnosis   • Primary osteoarthritis of shoulders, bilateral   • Left shoulder pain   • Complete tear of left rotator cuff   • Rotator cuff arthropathy of left shoulder     Past Medical History:   Diagnosis Date   • Arthritis    • GERD (gastroesophageal reflux disease)    • Hypertension    • Wears glasses      Past Surgical History:   Procedure Laterality Date   • COLONOSCOPY     • JOINT REPLACEMENT Left     knee   • KNEE SURGERY Left     x3   • TOTAL SHOULDER ARTHROPLASTY W/ DISTAL CLAVICLE EXCISION Left 2021    Procedure: LEFT TOTAL SHOULDER REVERSE ARTHROPLASTY;  Surgeon: Tom Brennan MD;  Location: [facility], [place];  Service: Orthopedics;  Laterality: Left;       OT ASSESSMENT FLOWSHEET (last 12 hours)     OT Evaluation and Treatment    No documentation.                 Occupational Therapy Education                 Title: PT OT SLP Therapies (Done)     Topic: Occupational Therapy (Done)     Point: ADL training (Done)     Description:   Instruct learner(s) on proper safety adaptation and remediation techniques during self care or transfers.   Instruct in proper use of assistive devices.              Learning Progress Summary           Patient Acceptance, E,D, VU,NR by AV at 2021 1142    Comment: Nonweightbearing left upper extremity  Shoulder precautions  Left upper extremity immobilizer-don/doffing, wear schedule  Need for staff assistance for all standing ADLs/transfers  ADL Home safety  Adaptive equipment recommendations                    Point: Home exercise program (Done)     Description:   Instruct learner(s) on appropriate technique for monitoring, assisting and/or progressing therapeutic exercises/activities.              Learning Progress Summary           Patient Acceptance, E,D, VU,NR by AV at 11/24/2021 1142    Comment: Nonweightbearing left upper extremity  Shoulder precautions  Left upper extremity immobilizer-don/doffing, wear schedule  Need for staff assistance for all standing ADLs/transfers  ADL Home safety  Adaptive equipment recommendations                   Point: Precautions (Done)     Description:   Instruct learner(s) on prescribed precautions during self-care and functional transfers.              Learning Progress Summary           Patient Acceptance, E,D, VU,NR by AV at 11/24/2021 1142    Comment: Nonweightbearing left upper extremity  Shoulder precautions  Left upper extremity immobilizer-don/doffing, wear schedule  Need for staff assistance for all standing ADLs/transfers  ADL Home safety  Adaptive equipment recommendations                   Point: Body mechanics (Done)     Description:   Instruct learner(s) on proper positioning and spine alignment during self-care, functional mobility activities and/or exercises.              Learning Progress Summary           Patient Acceptance, E,D, VU,NR by AV at 11/24/2021 1142    Comment: Nonweightbearing left upper extremity  Shoulder precautions  Left upper extremity immobilizer-don/doffing, wear schedule  Need for staff assistance for all standing ADLs/transfers  ADL Home safety  Adaptive equipment recommendations                               User Key     Initials Effective Dates Name Provider Type Discipline     06/16/21 -  Michael Hernandez OT Occupational Therapist OT                OT Recommendation and Plan                Outcome Measures     Row Name 11/24/21 1300             How much help from another person do you currently need...    Turning from your back to your side  while in flat bed without using bedrails? 4  -ELVIA      Moving from lying on back to sitting on the side of a flat bed without bedrails? 4  -ELVIA      Moving to and from a bed to a chair (including a wheelchair)? 4  -ELVIA      Standing up from a chair using your arms (e.g., wheelchair, bedside chair)? 4  -ELVIA      Climbing 3-5 steps with a railing? 4  -ELVIA      To walk in hospital room? 4  -ELVIA      AM-PAC 6 Clicks Score (PT) 24  -ELVIA              Functional Assessment    Outcome Measure Options AM-PAC 6 Clicks Basic Mobility (PT)  -ELVIA            User Key  (r) = Recorded By, (t) = Taken By, (c) = Cosigned By    Initials Name Provider Type    Edenilson Shah, PT Physical Therapist                Time Calculation:     Timed Therapy Charges  Total Units: 3    Charges  Total Units: 3    Procedure Name Documented Minutes Units Code    HC OT SELF CARE/MGMT/TRAIN EA 15 MIN 40  3    09903 (CPT®)               Documented Minutes  Total Minutes: 40    Therapy Provided Minutes    71911 - OT Self Care/Mgmt Minutes 40                       OT Discharge Summary  Anticipated Discharge Disposition (OT): home with assist, home with outpatient therapy services  Reason for Discharge: Discharge from facility, Per MD order  Outcomes Achieved: Patient able to partially acheive established goals  Discharge Destination: Home, Home with outpatient services    Gail Swanson OT  11/26/2021

## 2021-12-06 ENCOUNTER — OFFICE VISIT (OUTPATIENT)
Dept: ORTHOPEDIC SURGERY | Facility: CLINIC | Age: 61
End: 2021-12-06

## 2021-12-06 VITALS — HEART RATE: 88 BPM | HEIGHT: 73 IN | WEIGHT: 268.2 LBS | BODY MASS INDEX: 35.54 KG/M2 | OXYGEN SATURATION: 98 %

## 2021-12-06 DIAGNOSIS — M25.512 LEFT SHOULDER PAIN, UNSPECIFIED CHRONICITY: Primary | ICD-10-CM

## 2021-12-06 DIAGNOSIS — Z47.89 AFTERCARE FOLLOWING SURGERY OF THE MUSCULOSKELETAL SYSTEM: ICD-10-CM

## 2021-12-06 PROCEDURE — 99024 POSTOP FOLLOW-UP VISIT: CPT | Performed by: PHYSICIAN ASSISTANT

## 2021-12-06 NOTE — PROGRESS NOTES
"Chief Complaint  Pain of the Left Shoulder    Subjective          Dell Marie presents to [facility] for follow-up on left shoulder status post left reverse TSA 11/23/2021 by Dr. Brennan.  He states he is doing well, does not typically have much pain during the day.  He states he has a lot of pain in the evenings and has difficulty sleeping, only sleeping 45 minutes at a time.  He states he is using Norco sparingly and is not taking any ibuprofen at this time.  He is doing physical therapy at [facility] in [place] and home exercises daily.  Presents in a sling today.    Objective   Allergies   Allergen Reactions   • Lisinopril Cough       Vital Signs:   Pulse 88   Ht 185.4 cm (73\")   Wt 122 kg (268 lb 3.2 oz)   SpO2 98%   BMI 35.38 kg/m²       Physical Exam  Constitutional:       Appearance: Normal appearance. Patient is well-developed and normal weight.   HENT:      Head: Normocephalic.      Right Ear: Hearing and external ear normal.      Left Ear: Hearing and external ear normal.      Nose: Nose normal.   Eyes:      Conjunctiva/sclera: Conjunctivae normal.   Cardiovascular:      Rate and Rhythm: Normal rate.   Pulmonary:      Effort: Pulmonary effort is normal.      Breath sounds: No wheezing or rales.   Abdominal:      Palpations: Abdomen is soft.      Tenderness: There is no abdominal tenderness.   Musculoskeletal:      Cervical back: Normal range of motion.   Skin:     Findings: No rash.   Neurological:      Mental Status: Patient is alert and oriented to person, place, and time.   Psychiatric:         Mood and Affect: Mood and affect normal.         Judgment: Judgment normal.     Ortho Exam  Left shoulder: Well-healing surgical incision without erythema dehiscence or purulent drainage, tenderness and swelling about the wounds, able to perform gentle range of motion out of the sling, good range of motion left elbow wrist and digits.  Sensation light touch intact and radial " pulse 2+.  Result Review :            Imaging Results (Most Recent)     Procedure Component Value Units Date/Time    XR Shoulder 2+ View Left [[id number]] Resulted: 12/06/21 1013     Updated: 12/06/21 1013    Narrative:      X-Ray Report:  Study: X-rays ordered, taken in the office, and reviewed today  Site: Left shoulder xray  Indication: Pain  View: AP and Lateral view(s)  Findings: Hardware intact from reverse TSA, no acute osseous   abnormalities, malalignment or soft tissue abnormalities are noted  Prior studies available for comparison: yes       Impression:                      Assessment and Plan    Problem List Items Addressed This Visit        Musculoskeletal and Injuries    Left shoulder pain - Primary    Relevant Orders    XR Shoulder 2+ View Left    Aftercare following surgery of the left total shoulder reverse arthroplasty    Current Assessment & Plan     Staples removed, wound care discussed.  Recommend resting icing and elevating, continue sling for the next 2 to 4 weeks, continue PT.  X-rays taken and reviewed.  No heavy pushing pulling lifting or activity above chest level.  Follow-up in 4 weeks with no x-ray at that time.               Follow Up   Return in about 4 weeks (around 1/3/2022) for Recheck.  Patient Instructions   Staples removed, wound care discussed.  Recommend resting icing and elevating, continue sling for the next 2 to 4 weeks, continue PT.  X-rays taken and reviewed.  No heavy pushing pulling lifting or activity above chest level.  Follow-up in 4 weeks with no x-ray at that time.    Patient was given instructions and counseling regarding his condition or for health maintenance advice. Please see specific information pulled into the AVS if appropriate.

## 2021-12-06 NOTE — PATIENT INSTRUCTIONS
Staples removed, wound care discussed.  Recommend resting icing and elevating, continue sling for the next 2 to 4 weeks, continue PT.  X-rays taken and reviewed.  No heavy pushing pulling lifting or activity above chest level.  Follow-up in 4 weeks with no x-ray at that time.

## 2021-12-27 ENCOUNTER — OFFICE VISIT (OUTPATIENT)
Dept: ORTHOPEDIC SURGERY | Facility: CLINIC | Age: 61
End: 2021-12-27

## 2021-12-27 VITALS — BODY MASS INDEX: 35.52 KG/M2 | OXYGEN SATURATION: 98 % | WEIGHT: 268 LBS | HEIGHT: 73 IN | HEART RATE: 76 BPM

## 2021-12-27 DIAGNOSIS — Z47.89 AFTERCARE FOLLOWING SURGERY OF THE MUSCULOSKELETAL SYSTEM: Primary | ICD-10-CM

## 2021-12-27 PROCEDURE — 99024 POSTOP FOLLOW-UP VISIT: CPT | Performed by: PHYSICIAN ASSISTANT

## 2021-12-27 NOTE — PROGRESS NOTES
"Chief Complaint  Follow-up of the Left Shoulder    Subjective          Dell Marie presents to [facility] for follow-up on left shoulder status post left reverse total shoulder arthroplasty 11/23/2021 by Dr. Brennan.  Patient states he is doing really well, pain is mild, described as an ache.  States he has not taken a narcotic pain medication since December 9 because pain has been well controlled.  States physical therapy is going well.  Patient does state that he slipped and fell on water yesterday, states he landed on his bottom and does not feel that he hurt the left shoulder.  Presents using a sling today.  Objective   Allergies   Allergen Reactions   • Lisinopril Cough       Vital Signs:   Pulse 76   Ht 185.4 cm (73\")   Wt 122 kg (268 lb)   SpO2 98%   BMI 35.36 kg/m²       Physical Exam  Constitutional:       Appearance: Normal appearance. Patient is well-developed and normal weight.   HENT:      Head: Normocephalic.      Right Ear: Hearing and external ear normal.      Left Ear: Hearing and external ear normal.      Nose: Nose normal.   Eyes:      Conjunctiva/sclera: Conjunctivae normal.   Cardiovascular:      Rate and Rhythm: Normal rate.   Pulmonary:      Effort: Pulmonary effort is normal.      Breath sounds: No wheezing or rales.   Abdominal:      Palpations: Abdomen is soft.      Tenderness: There is no abdominal tenderness.   Musculoskeletal:      Cervical back: Normal range of motion.   Skin:     Findings: No rash.   Neurological:      Mental Status: Patient is alert and oriented to person, place, and time.   Psychiatric:         Mood and Affect: Mood and affect normal.         Judgment: Judgment normal.     Ortho Exam  Left shoulder: Skin intact with well-healed surgical incision, no erythema dehiscence or purulent drainage, no swelling, mild tenderness about the shoulder, active flexion 130 abduction 115 internal rotation to the pocket external rotation to 45.  " Sensation is intact, radial pulse 2+, good range of motion left elbow wrist and digits  Result Review :            Imaging Results (Most Recent)     None                Assessment and Plan    Problem List Items Addressed This Visit        Musculoskeletal and Injuries    Aftercare following surgery of the left total shoulder reverse arthroplasty - Primary    Current Assessment & Plan     Recommend continuation of PT and home exercises to work on range of motion and strengthening.  Rest ice elevate.  Ibuprofen or Tylenol for pain relief.  Avoid heavy pushing pulling or lifting.  Discontinue sling.  Follow-up in 6 weeks for recheck with x-ray at that time.               Follow Up   Return in about 6 weeks (around 2/7/2022) for Recheck.  Patient Instructions   Recommend continuation of PT and home exercises to work on range of motion and strengthening.  Rest ice elevate.  Ibuprofen or Tylenol for pain relief.  Avoid heavy pushing pulling or lifting.  Discontinue sling.  Follow-up in 6 weeks for recheck with x-ray at that time.    Patient was given instructions and counseling regarding his condition or for health maintenance advice. Please see specific information pulled into the AVS if appropriate.

## 2021-12-27 NOTE — PATIENT INSTRUCTIONS
Recommend continuation of PT and home exercises to work on range of motion and strengthening.  Rest ice elevate.  Ibuprofen or Tylenol for pain relief.  Avoid heavy pushing pulling or lifting.  Discontinue sling.  Follow-up in 6 weeks for recheck with x-ray at that time.

## 2022-02-07 ENCOUNTER — OFFICE VISIT (OUTPATIENT)
Dept: ORTHOPEDIC SURGERY | Facility: CLINIC | Age: 62
End: 2022-02-07

## 2022-02-07 VITALS — HEART RATE: 68 BPM | HEIGHT: 73 IN | BODY MASS INDEX: 35.12 KG/M2 | OXYGEN SATURATION: 97 % | WEIGHT: 265 LBS

## 2022-02-07 DIAGNOSIS — M25.512 LEFT SHOULDER PAIN, UNSPECIFIED CHRONICITY: Primary | ICD-10-CM

## 2022-02-07 DIAGNOSIS — Z47.89 AFTERCARE FOLLOWING SURGERY OF THE MUSCULOSKELETAL SYSTEM: ICD-10-CM

## 2022-02-07 PROCEDURE — 99024 POSTOP FOLLOW-UP VISIT: CPT | Performed by: PHYSICIAN ASSISTANT

## 2022-02-07 NOTE — PROGRESS NOTES
"Chief Complaint  Follow-up of the Left Shoulder    Subjective          Dell Marie presents to [facility] for follow-up on left shoulder status post left reverse total shoulder arthroplasty by Dr. Brennan 11/23/2021.  Patient is doing well.  He is lifting up to 8 pounds at PT and is achieving great range of motion.  He continues home exercises throughout the week.  He still has some soreness especially when he gets cold outside.  Takes Mobic for arthritis.  He is a retired teacher in [employer].  He states has been bored at home and wants to get a part-time job.  States he does want to do anything strenuous with heavy pushing pulling or lifting.  He plans to return to golf gradually but plans to wait till the fall to do so.  Overall he is happy with his progress.    Objective   Allergies   Allergen Reactions   • Lisinopril Cough       Vital Signs:   Pulse 68   Ht 185.4 cm (73\")   Wt 120 kg (265 lb)   SpO2 97%   BMI 34.96 kg/m²       Physical Exam  Constitutional:       Appearance: Normal appearance. Patient is well-developed and normal weight.   HENT:      Head: Normocephalic.      Right Ear: Hearing and external ear normal.      Left Ear: Hearing and external ear normal.      Nose: Nose normal.   Eyes:      Conjunctiva/sclera: Conjunctivae normal.   Cardiovascular:      Rate and Rhythm: Normal rate.   Pulmonary:      Effort: Pulmonary effort is normal.      Breath sounds: No wheezing or rales.   Abdominal:      Palpations: Abdomen is soft.      Tenderness: There is no abdominal tenderness.   Musculoskeletal:      Cervical back: Normal range of motion.   Skin:     Findings: No rash.   Neurological:      Mental Status: Patient is alert and oriented to person, place, and time.   Psychiatric:         Mood and Affect: Mood and affect normal.         Judgment: Judgment normal.     Ortho Exam  Left shoulder: Skin intact, no swelling, well-healed surgical incisions without " erythema dehiscence or purulent drainage, range of motion flexion is full abduction is full internal rotation to the pocket and external rotation to 40.  Sensation intact and radial pulse 2+, good range of motion left elbow wrist digits  Result Review :            Imaging Results (Most Recent)     Procedure Component Value Units Date/Time    XR Scapula Left [[id number]] Resulted: 02/07/22 1408     Updated: 02/07/22 1409    Narrative:      X-Ray Report:  Study: X-rays ordered, taken in the office, and reviewed today  Site: Left scapula xray  Indication: Pain  View: AP and Lateral view(s)  Findings: Hardware intact from left reverse TSA without loosening or   hardware failure, no malalignment or acute osseous or soft tissue   derangements are noted  Prior studies available for comparison: yes                   Assessment and Plan    Problem List Items Addressed This Visit        Musculoskeletal and Injuries    Left shoulder pain - Primary    Relevant Orders    XR Scapula Left (Completed)    Aftercare following surgery of the left total shoulder reverse arthroplasty    Current Assessment & Plan     Patient plans to finish up physical therapy and continue daily home exercises.  Recommend he gradually ease back into every day activities and hobbies.  We will follow-up at 1 year appointment in November 2022 with x-rays of the shoulder at that time or sooner for new or worsening symptoms.               Follow Up   Return in about 9 months (around 11/23/2022) for For annual total joint recheck.  Patient Instructions   Patient plans to finish up physical therapy and continue daily home exercises.  Recommend he gradually ease back into every day activities and hobbies.  We will follow-up at 1 year appointment in November 2022 with x-rays of the shoulder at that time or sooner for new or worsening symptoms.    Patient was given instructions and counseling regarding his condition or for health maintenance advice. Please see  specific information pulled into the AVS if appropriate.

## 2022-02-07 NOTE — PATIENT INSTRUCTIONS
Patient plans to finish up physical therapy and continue daily home exercises.  Recommend he gradually ease back into every day activities and hobbies.  We will follow-up at 1 year appointment in November 2022 with x-rays of the shoulder at that time or sooner for new or worsening symptoms.

## (undated) DEVICE — 1010 S-DRAPE TOWEL DRAPE 10/BX: Brand: STERI-DRAPE™

## (undated) DEVICE — SOL IRR NACL 0.9PCT BT 250ML

## (undated) DEVICE — ELASTIC SHOULDER IMMOBILIZER: Brand: DEROYAL

## (undated) DEVICE — TOTAL KNEE-LF: Brand: MEDLINE INDUSTRIES, INC.

## (undated) DEVICE — CVR LEG BOOTLEG F/R NOSKID 33IN

## (undated) DEVICE — BIPOLAR SEALER 23-112-1 AQM 6.0: Brand: AQUAMANTYS™

## (undated) DEVICE — GLV SURG ULTRAFREE MAX LTX PF 8

## (undated) DEVICE — 450 ML BOTTLE OF 0.05% CHLORHEXIDINE GLUCONATE IN 99.95% STERILE WATER FOR IRRIGATION, USP AND APPLICATOR.: Brand: IRRISEPT ANTIMICROBIAL WOUND LAVAGE

## (undated) DEVICE — SUT VIC UD BR COAT 0 CP2 27IN

## (undated) DEVICE — FAN SPRAY KIT: Brand: PULSAVAC®

## (undated) DEVICE — GAUZE,SPONGE,4"X4",16PLY,STRL,LF,10/TRAY: Brand: MEDLINE

## (undated) DEVICE — SLV SCD LEG COMFORT KENDALLSCD MD REPROC

## (undated) DEVICE — ENCORE® LATEX ORTHO SIZE 8, STERILE LATEX POWDER-FREE SURGICAL GLOVE: Brand: ENCORE

## (undated) DEVICE — DRSNG PAD ABD 8X10IN STRL

## (undated) DEVICE — APPL CHLORAPREP HI/LITE 26ML ORNG

## (undated) DEVICE — STERILE POLYISOPRENE POWDER-FREE SURGICAL GLOVES: Brand: PROTEXIS

## (undated) DEVICE — TOWEL,OR,DSP,ST,BLUE,STD,4/PK,20PK/CS: Brand: MEDLINE

## (undated) DEVICE — PULLOVER TOGA, 2X LARGE: Brand: FLYTE, SURGICOOL

## (undated) DEVICE — BIT DRL SCRW PERIPH 2.7MM

## (undated) DEVICE — UNDYED BRAIDED (POLYGLACTIN 910), SYNTHETIC ABSORBABLE SUTURE: Brand: COATED VICRYL

## (undated) DEVICE — STRYKER PERFORMANCE SERIES SAGITTAL BLADE: Brand: STRYKER PERFORMANCE SERIES

## (undated) DEVICE — MAT FLR ABS W/BLU/LINER 56X72IN WHT

## (undated) DEVICE — 3M™ STERI-DRAPE™ U-DRAPE 1015: Brand: STERI-DRAPE™

## (undated) DEVICE — SOL IRR NACL 0.9PCT 3000ML

## (undated) DEVICE — SUT VIC PLS CTD BR 0 TIE 18IN VIL

## (undated) DEVICE — ZIPPERED TOGA, PEEL-AWAY 2X LARGE: Brand: FLYTE, SURGICOOL

## (undated) DEVICE — SYR LUER SLPTP 50ML